# Patient Record
Sex: MALE | Race: WHITE | NOT HISPANIC OR LATINO | Employment: OTHER | URBAN - METROPOLITAN AREA
[De-identification: names, ages, dates, MRNs, and addresses within clinical notes are randomized per-mention and may not be internally consistent; named-entity substitution may affect disease eponyms.]

---

## 2017-01-05 ENCOUNTER — NON-PROVIDER VISIT (OUTPATIENT)
Dept: MEDICAL GROUP | Facility: MEDICAL CENTER | Age: 67
End: 2017-01-05
Payer: MEDICARE

## 2017-01-05 VITALS — HEIGHT: 74 IN | BODY MASS INDEX: 35.94 KG/M2 | WEIGHT: 280 LBS

## 2017-01-05 PROCEDURE — G0447 BEHAVIOR COUNSEL OBESITY 15M: HCPCS | Performed by: INTERNAL MEDICINE

## 2017-01-05 NOTE — MR AVS SNAPSHOT
"        Gaudencio Berry   2017 8:00 AM   Non-Provider Visit   MRN: [id number]    Department:  [facility]   Dept Phone:  [phone]    Description:  Male : 1950   Provider:  Carmen Merchant RD           Reason for Visit     Obesity           Allergies as of 2017     Allergen Noted Reactions    Codeine 10/22/2015   Rash, Itching    Per patient, itch all over body, rash    Shellfish Allergy 10/22/2015   Diarrhea, Vomiting    Per patient, GI issues, vomiting, diarrhea      Vital Signs     Height Weight Body Mass Index Smoking Status          1.88 m (6' 2\") 127.007 kg (280 lb) 35.93 kg/m2 Former Smoker        Basic Information     Date Of Birth Sex Race Ethnicity Preferred Language    1950 Male White Unknown English      Your appointments     2017  8:00 AM   Intensive Behavioral Therapy with Carmen Merchant RD   [facility])    [address]  [facility] [id number] [phone]              Problem List              ICD-10-CM Priority Class Noted - Resolved    Essential hypertension I10   10/22/2015 - Present    Arthritis of neck (HCC) M46.92   10/22/2015 - Present    Allergic rhinitis due to pollen J30.1   10/22/2015 - Present    Preventative health care Z00.00   2016 - Present    Bilateral hip pain M25.551, M25.552   2016 - Present    Dyslipidemia, goal LDL below 100 E78.5   2016 - Present    Obesity (BMI 30-39.9) E66.9   2016 - Present    Acute maxillary sinusitis J01.00   8/3/2016 - Present      Health Maintenance        Date Due Completion Dates    COLONOSCOPY 1999 ---    IMM INFLUENZA (1) 2016 10/22/2015    IMM DTaP/Tdap/Td Vaccine (2 - Td) 2020            Current Immunizations     13-VALENT PCV PREVNAR 2015    Influenza Vaccine Adult HD 10/22/2015    Pneumococcal polysaccharide vaccine (PPSV-23) 10/22/2015    SHINGLES VACCINE 2015    Tdap Vaccine 2010      Below and/or " attached are the medications your provider expects you to take. Review all of your home medications and newly ordered medications with your provider and/or pharmacist. Follow medication instructions as directed by your provider and/or pharmacist. Please keep your medication list with you and share with your provider. Update the information when medications are discontinued, doses are changed, or new medications (including over-the-counter products) are added; and carry medication information at all times in the event of emergency situations     Allergies:  CODEINE - Rash,Itching     SHELLFISH ALLERGY - Diarrhea,Vomiting               Medications  Valid as of: January 05, 2017 -  8:21 AM    Generic Name Brand Name Tablet Size Instructions for use    Amoxicillin-Pot Clavulanate (Tab) AUGMENTIN 875-125 MG Take 1 Tab by mouth 2 times a day.        Cholecalciferol (Cap) Vitamin D 2000 UNITS Take  by mouth every day.        Ibuprofen (Tab) MOTRIN 800 MG Take 1 Tab by mouth every 8 hours as needed.        Lisinopril-Hydrochlorothiazide (Tab) PRINZIDE, ZESTORETIC 10-12.5 MG Take 1 Tab by mouth every day.        Phenylephrine-APAP-Guaifenesin   Take  by mouth.        .                 Medicines prescribed today were sent to:     [facility] [address] [place] [address] (S), [place] [address]    [facility] ([place] [place] [address]    Phone: [phone] Fax: [phone]    Open 24 Hours?: No      Medication refill instructions:       If your prescription bottle indicates you have medication refills left, it is not necessary to call your provider’s office. Please contact your pharmacy and they will refill your medication.    If your prescription bottle indicates you do not have any refills left, you may request refills at any time through one of the following ways: The online Applango system (except Urgent Care), by calling your provider’s office, or by asking your pharmacy to contact your provider’s office with a refill request.  Medication refills are processed only during regular business hours and may not be available until the next business day. Your provider may request additional information or to have a follow-up visit with you prior to refilling your medication.   *Please Note: Medication refills are assigned a new Rx number when refilled electronically. Your pharmacy may indicate that no refills were authorized even though a new prescription for the same medication is available at the pharmacy. Please request the medicine by name with the pharmacy before contacting your provider for a refill.           [employer] Access Code: Activation code not generated  Current [employer] Status: Active

## 2017-01-05 NOTE — PROGRESS NOTES
"1/5/2017     Visit # 13     Referring Provider: Cony Judge A.P.R.N. Paul Vieira 66 y.o.           Time in/Out:   08:03-08:17am     ASSESS:    Filed Vitals:    01/05/17 0818   Height: 1.88 m (6' 2\")   Weight: 127.007 kg (280 lb)            Wt Readings from Last 2 Encounters:   01/05/17 127.007 kg (280 lb)   12/15/16 125.102 kg (275 lb 12.8 oz)            Body mass index is 35.93 kg/(m^2).       Weight change since last visit:  +4.2lb      Starting weight:  274.7lb      Total weight change:  +5.3lb      Current Dietary/Exercise Habits:  Pt states he has been eating out more, and eating larger, richer meals during the Holidays for the past 2 weeks. Also drinking more alcohol over the two 3 days weekends for the Holidays. Feels he has been consistent with his meal otherwise. Continues to be active at work.     Estimated Stage of Change:  Contemplation as evidenced by pt feeling that he cannot lose weight no matter what he does.      ADVISE:    Physical Activity:   Pt is active at work and gets about 10,000 steps per day. Also walks his dogs some days.      Dietary Guidelines:   Encouraged pt to keep a food journal this next week and keep better track of the unusual days of eating, rather than just the typical day. He may be consuming additional calories outside of his typical meal pattern that he is not mindful of. He seems to eat out a lot and consumes many high sodium precooked meals as well. Recommended that he bring in the food labels of some things he eats this week for us to review.     The patient has been advised of how weight management and physical activity impacts their health and will help to reduce complications and health risk factors.        AGREE:  Previous Goals:   1. Aim for >10,000 steps per day  2. Do not skip meals. Eat 3 meals a day    3. Eat leaner proteins; include a all meals     New Goals:   1. Aim for 10,000 steps or more per day.   2. Keep a food journal   3. Bring in food labels " of things you have eaten this past week or 2       ASSIST:  Pt has gained weight over the Holidays. Some may be from fluid retention as he has been eating more high sodium foods -- pastas at restaurants, soups, breads etc. He was not cautious of his food choices over the Holidays and that seems to be also why he has gained weight. Pt seems to think that he will not lose any weight despite eating what he feels is small balanced meals. Although his day to day meals are not likely exsessive in calories, he may be getting excess calories from eating out too often or other sources to be determined. We will review his food journal next visit.   Pt may need to start additional exercise in order to lose weight.     ARRANGE:     Return for follow-up in  2 weeks      The patient was assisted in making follow-up appointment per orders.

## 2017-01-19 ENCOUNTER — NON-PROVIDER VISIT (OUTPATIENT)
Dept: MEDICAL GROUP | Facility: MEDICAL CENTER | Age: 67
End: 2017-01-19
Payer: MEDICARE

## 2017-01-19 VITALS — WEIGHT: 280 LBS | HEIGHT: 74 IN | BODY MASS INDEX: 35.94 KG/M2

## 2017-01-19 PROCEDURE — G0447 BEHAVIOR COUNSEL OBESITY 15M: HCPCS | Performed by: FAMILY MEDICINE

## 2017-01-19 NOTE — MR AVS SNAPSHOT
Gaudencio Berry   2017 8:00 AM   Appointment   MRN: [id number]    Department:  [facility]   Dept Phone:  [phone]    Description:  Male : 1950   Provider:  Carmen Merchant RD           Allergies as of 2017     Allergen Noted Reactions    Codeine 10/22/2015   Rash, Itching    Per patient, itch all over body, rash    Shellfish Allergy 10/22/2015   Diarrhea, Vomiting    Per patient, GI issues, vomiting, diarrhea      Vital Signs     Smoking Status                   Former Smoker           Basic Information     Date Of Birth Sex Race Ethnicity Preferred Language    1950 Male White Unknown English      Your appointments     2017  8:00 AM   Intensive Behavioral Therapy with Carmen Merchant RD   [facility])    [address]  [facility] [address] [phone]              Problem List              ICD-10-CM Priority Class Noted - Resolved    Essential hypertension I10   10/22/2015 - Present    Arthritis of neck (CMS-HCC) M46.92   10/22/2015 - Present    Allergic rhinitis due to pollen J30.1   10/22/2015 - Present    Preventative health care Z00.00   2016 - Present    Bilateral hip pain M25.551, M25.552   2016 - Present    Dyslipidemia, goal LDL below 100 E78.5   2016 - Present    Obesity (BMI 30-39.9) E66.9   2016 - Present    Acute maxillary sinusitis J01.00   8/3/2016 - Present      Health Maintenance        Date Due Completion Dates    COLONOSCOPY 1999 ---    IMM INFLUENZA (1) 2016 10/22/2015    IMM DTaP/Tdap/Td Vaccine (2 - Td) 2020            Current Immunizations     13-VALENT PCV PREVNAR 2015    Influenza Vaccine Adult HD 10/22/2015    Pneumococcal polysaccharide vaccine (PPSV-23) 10/22/2015    SHINGLES VACCINE 2015    Tdap Vaccine 2010      Below and/or attached are the medications your provider expects you to take. Review all of your home medications and newly  ordered medications with your provider and/or pharmacist. Follow medication instructions as directed by your provider and/or pharmacist. Please keep your medication list with you and share with your provider. Update the information when medications are discontinued, doses are changed, or new medications (including over-the-counter products) are added; and carry medication information at all times in the event of emergency situations     Allergies:  CODEINE - Rash,Itching     SHELLFISH ALLERGY - Diarrhea,Vomiting               Medications  Valid as of: January 19, 2017 -  8:17 AM    Generic Name Brand Name Tablet Size Instructions for use    Amoxicillin-Pot Clavulanate (Tab) AUGMENTIN 875-125 MG Take 1 Tab by mouth 2 times a day.        Cholecalciferol (Cap) Vitamin D 2000 UNITS Take  by mouth every day.        Ibuprofen (Tab) MOTRIN 800 MG Take 1 Tab by mouth every 8 hours as needed.        Lisinopril-Hydrochlorothiazide (Tab) PRINZIDE, ZESTORETIC 10-12.5 MG Take 1 Tab by mouth every day.        Phenylephrine-APAP-Guaifenesin   Take  by mouth.        .                 Medicines prescribed today were sent to:     [address] ([place]), [place] - [address] [employer] [address] ([place]) [place] [address]    Phone: [phone] Fax: [phone]    Open 24 Hours?: No      Medication refill instructions:       If your prescription bottle indicates you have medication refills left, it is not necessary to call your provider’s office. Please contact your pharmacy and they will refill your medication.    If your prescription bottle indicates you do not have any refills left, you may request refills at any time through one of the following ways: The online HaveMyShift system (except Urgent Care), by calling your provider’s office, or by asking your pharmacy to contact your provider’s office with a refill request. Medication refills are processed only during regular business hours and may not be available until the next  business day. Your provider may request additional information or to have a follow-up visit with you prior to refilling your medication.   *Please Note: Medication refills are assigned a new Rx number when refilled electronically. Your pharmacy may indicate that no refills were authorized even though a new prescription for the same medication is available at the pharmacy. Please request the medicine by name with the pharmacy before contacting your provider for a refill.           [employer] Access Code: Activation code not generated  Current [employer] Status: Active

## 2017-01-19 NOTE — PROGRESS NOTES
"1/19/2017     Visit # 13     Referring Provider: Cony Judge A.P.R.N. Paul Vieira 66 y.o.           Time in/Out:   0800-0820am    ASSESS:    Filed Vitals:    01/19/17 1031   Height: 1.88 m (6' 2.02\")   Weight: 127.007 kg (280 lb)            Wt Readings from Last 2 Encounters:   01/19/17 127.007 kg (280 lb)   01/05/17 127.007 kg (280 lb)            Body mass index is 35.93 kg/(m^2).       Weight change since last visit:   +4.2lb      Starting weight:  274.7lb      Total weight change:  +5.3lb      Current Dietary/Exercise Habits:  Pt kept a food journal this week. He is eating 3 meals a day and 0-1 snacks. Diet hx: B- oatmeal inst packet. Coffee with half and half. S- orange. L- reduced sodium salami or turkey sandwich with 15 chips, and banana or fruit. Water. D- rice, chicken sausage, water. Or another sandwich with chips. Pt is walking 3-4 miles a day (based on fit bit)     Estimated Stage of Change:  Preparation as evidenced by planning to eat more vegetables .      ADVISE:    Physical Activity:  Explained that in order to lose weight, pt will need to either increase calorie expenditure or decrease calorie consumption. He feels he is an active as he can be.      Dietary Guidelines:   Reviewed diet hx, and label reading for the salami. Although it is reduced sodium explained that it is still high in sodium (1oz = 14% DV) and high in saturated fat. He agrees to doing more turkey roasted pieces, cooked at home. Also explained that the chips he consume also provides high amounts of sodium and fat and suggested baked chips.     The patient has been advised of how weight management and physical activity impacts their health and will help to reduce complications and health risk factors.        AGREE:  Previous Goals:   1. Aim for 10,000 steps or more per day.    2. Keep a food journal    3. Bring in food labels of things you have eaten this past week or 2     New Goals:   1. Eat more vegetables + at lunch and " dinner   2. Choose salami 1-2 times per week , otherwise would do turkey for sandwiches   3. Consider switching to baked chips.       ASSIST:  Based on diet hx/ food journal patient is not likely exceeding his calorie goal. Estimated calorie intake is ~1700-1800kcal per day. Maybe more, based on estimated portions. If pt is eating ~1800kcal, I would anticipate he should losing weight. He is either eating large portions than indicated or may have lower calorie needs than expected. Either way, pt has not been able to lose weight at current calorie consumption. He would either need to decrease kcal by 100-200kcal and/or increase energy expenditure. He also has not been eating many vegetables and would benefit from that. Additionally, he food journal indicates he is consuming >3000mg sodium per day. Goal should be 2000mg/day.   Pt has 1 more visit and unfortunately has not been able to lose any weight.       ARRANGE:     Return for follow-up in 2 weeks     The patient was assisted in making follow-up appointment per orders.

## 2017-02-02 ENCOUNTER — NON-PROVIDER VISIT (OUTPATIENT)
Dept: MEDICAL GROUP | Facility: MEDICAL CENTER | Age: 67
End: 2017-02-02
Payer: MEDICARE

## 2017-02-02 VITALS — HEIGHT: 74 IN | WEIGHT: 280 LBS | BODY MASS INDEX: 35.94 KG/M2

## 2017-02-02 PROCEDURE — G0447 BEHAVIOR COUNSEL OBESITY 15M: HCPCS | Performed by: FAMILY MEDICINE

## 2017-02-02 NOTE — MR AVS SNAPSHOT
"        Gaudencio Berry   2017 8:00 AM   Non-Provider Visit   MRN: [id number]    Department:  [facility]   Dept Phone:  [phone]    Description:  Male : 1950   Provider:  Carmen Cho RD           Reason for Visit     Obesity           Allergies as of 2017     Allergen Noted Reactions    Codeine 10/22/2015   Rash, Itching    Per patient, itch all over body, rash    Shellfish Allergy 10/22/2015   Diarrhea, Vomiting    Per patient, GI issues, vomiting, diarrhea      Vital Signs     Height Weight Body Mass Index Smoking Status          1.88 m (6' 2\") 127.007 kg (280 lb) 35.93 kg/m2 Former Smoker        Basic Information     Date Of Birth Sex Race Ethnicity Preferred Language    1950 Male White Unknown English      Your appointments     2017  7:40 AM   Access New To You with Hannah Garcia M.D.   [facility]    [address]  [facility] [address] [phone]              Problem List              ICD-10-CM Priority Class Noted - Resolved    Essential hypertension I10   10/22/2015 - Present    Arthritis of neck (CMS-HCC) M46.92   10/22/2015 - Present    Allergic rhinitis due to pollen J30.1   10/22/2015 - Present    Preventative health care Z00.00   2016 - Present    Bilateral hip pain M25.551, M25.552   2016 - Present    Dyslipidemia, goal LDL below 100 E78.5   2016 - Present    Obesity (BMI 30-39.9) E66.9   2016 - Present    Acute maxillary sinusitis J01.00   8/3/2016 - Present      Health Maintenance        Date Due Completion Dates    COLONOSCOPY 1999 ---    IMM INFLUENZA (1) 2016 10/22/2015    IMM DTaP/Tdap/Td Vaccine (2 - Td) 2020            Current Immunizations     13-VALENT PCV PREVNAR 2015    Influenza Vaccine Adult HD 10/22/2015    Pneumococcal polysaccharide vaccine (PPSV-23) 10/22/2015    SHINGLES VACCINE 2015    Tdap Vaccine 2010      Below and/or attached are " the medications your provider expects you to take. Review all of your home medications and newly ordered medications with your provider and/or pharmacist. Follow medication instructions as directed by your provider and/or pharmacist. Please keep your medication list with you and share with your provider. Update the information when medications are discontinued, doses are changed, or new medications (including over-the-counter products) are added; and carry medication information at all times in the event of emergency situations     Allergies:  CODEINE - Rash,Itching     SHELLFISH ALLERGY - Diarrhea,Vomiting               Medications  Valid as of: February 02, 2017 -  8:19 AM    Generic Name Brand Name Tablet Size Instructions for use    Amoxicillin-Pot Clavulanate (Tab) AUGMENTIN 875-125 MG Take 1 Tab by mouth 2 times a day.        Cholecalciferol (Cap) Vitamin D 2000 UNITS Take  by mouth every day.        Ibuprofen (Tab) MOTRIN 800 MG Take 1 Tab by mouth every 8 hours as needed.        Lisinopril-Hydrochlorothiazide (Tab) PRINZIDE, ZESTORETIC 10-12.5 MG Take 1 Tab by mouth every day.        Phenylephrine-APAP-Guaifenesin   Take  by mouth.        .                 Medicines prescribed today were sent to:     [facility] [address] [place] [address] ([place]), [place] - [address]    [facility] ([place]) [place] [address]    Phone: [phone] Fax: [phone]    Open 24 Hours?: No      Medication refill instructions:       If your prescription bottle indicates you have medication refills left, it is not necessary to call your provider’s office. Please contact your pharmacy and they will refill your medication.    If your prescription bottle indicates you do not have any refills left, you may request refills at any time through one of the following ways: The online [employer] system (except Urgent Care), by calling your provider’s office, or by asking your pharmacy to contact your provider’s office with a refill request. Medication  refills are processed only during regular business hours and may not be available until the next business day. Your provider may request additional information or to have a follow-up visit with you prior to refilling your medication.   *Please Note: Medication refills are assigned a new Rx number when refilled electronically. Your pharmacy may indicate that no refills were authorized even though a new prescription for the same medication is available at the pharmacy. Please request the medicine by name with the pharmacy before contacting your provider for a refill.           Secure Software Access Code: Activation code not generated  Current Secure Software Status: Active

## 2017-02-02 NOTE — PROGRESS NOTES
"2/2/2017     Visit #14      Referring Provider: Cony Judge A.P.R.N. Paul Vieira 66 y.o.           Time in/Out:   0800-0820am     ASSESS:    Filed Vitals:    02/02/17 0758   Height: 1.88 m (6' 2\")   Weight: 127.007 kg (280 lb)            Wt Readings from Last 2 Encounters:   02/02/17 127.007 kg (280 lb)   01/19/17 127.007 kg (280 lb)            Body mass index is 35.93 kg/(m^2).       Weight change since last visit:   -0lb      Starting weight:  274.7lb       Total weight change:  +5.3lb      Current Dietary/Exercise Habits:  Pt states he has been eating out more this past few weeks. He has been busy and on the go so is not eating at home as much. Pt has been working more hours at his job than he would like. His wife also works full time so they are not cooking much for meals at home. Does bring his lunch to work. Has decided to reduce his sandwich to half and cut back on the chips. Also plans to reduce his bread intake overall. He feels he can be more active once he is not working so much and when the weather is nicer    Estimated Stage of Change:  Preparation as evidenced by starting to understand that he need to try changes to his diet in order to see results.      ADVISE:    Physical Activity:   Pt is active at his job and tries to walk in the mornings before work. Feels this will get better when the weather is nicer.      Dietary Guidelines:  Reviewed my fitness pal entry of a \"typical\" day of eating that he previously provided me. Based on that day, pt consumes ~1700kcal between 3 meals. That of course is not when he eats out which I feel he is likely exceeding his kcal goal those days. His total carb intake does seem high, especially at lunch and he is not eating enough veggies. He agrees to bring salad to work with 1/2 sandwich (ideally low sodium turkey instead of salami), with fruit instead of chips.     The patient has been advised of how weight management and physical activity impacts their " health and will help to reduce complications and health risk factors.        AGREE:  Previous Goals:   1. Eat more vegetables + at lunch and dinner    2. Choose salami 1-2 times per week , otherwise would do turkey for sandwiches    3. Consider switching to baked chips.       ASSIST:  Pt has started to think about and verbalize when he is willing to change with his diet. He reports that he eats very similar meals each day . I have made suggestions multiple times to cut back on the chips and salami. It was not until today he brought it up himself that he is willing to change that. Unfortunately pt did not lose >3kg in the past 6  Months and has in fact gained weight. Therefore today is our final meeting. He can restart the program in 6 months if he feels it would benefit him and if he is more willing to try the dietary changes suggested.       ARRANGE:     Return for follow-up in 6th months as desired. Would need a new Referral      The patient was assisted in making follow-up appointment per orders.

## 2017-02-24 ENCOUNTER — OFFICE VISIT (OUTPATIENT)
Dept: MEDICAL GROUP | Facility: MEDICAL CENTER | Age: 67
End: 2017-02-24
Payer: MEDICARE

## 2017-02-24 VITALS
WEIGHT: 285 LBS | HEART RATE: 88 BPM | BODY MASS INDEX: 36.57 KG/M2 | HEIGHT: 74 IN | SYSTOLIC BLOOD PRESSURE: 128 MMHG | DIASTOLIC BLOOD PRESSURE: 88 MMHG | TEMPERATURE: 97.4 F | OXYGEN SATURATION: 95 %

## 2017-02-24 DIAGNOSIS — L81.9 CHANGING PIGMENTED SKIN LESION: ICD-10-CM

## 2017-02-24 DIAGNOSIS — M16.0 BILATERAL HIP JOINT ARTHRITIS: ICD-10-CM

## 2017-02-24 DIAGNOSIS — E66.9 OBESITY (BMI 30-39.9): ICD-10-CM

## 2017-02-24 DIAGNOSIS — M47.812 ARTHRITIS OF NECK: ICD-10-CM

## 2017-02-24 DIAGNOSIS — E78.5 DYSLIPIDEMIA, GOAL LDL BELOW 100: ICD-10-CM

## 2017-02-24 DIAGNOSIS — I10 ESSENTIAL HYPERTENSION: ICD-10-CM

## 2017-02-24 PROCEDURE — G8432 DEP SCR NOT DOC, RNG: HCPCS | Performed by: FAMILY MEDICINE

## 2017-02-24 PROCEDURE — 1036F TOBACCO NON-USER: CPT | Performed by: FAMILY MEDICINE

## 2017-02-24 PROCEDURE — 4040F PNEUMOC VAC/ADMIN/RCVD: CPT | Performed by: FAMILY MEDICINE

## 2017-02-24 PROCEDURE — G8419 CALC BMI OUT NRM PARAM NOF/U: HCPCS | Performed by: FAMILY MEDICINE

## 2017-02-24 PROCEDURE — 1101F PT FALLS ASSESS-DOCD LE1/YR: CPT | Performed by: FAMILY MEDICINE

## 2017-02-24 PROCEDURE — 3017F COLORECTAL CA SCREEN DOC REV: CPT | Mod: 1P | Performed by: FAMILY MEDICINE

## 2017-02-24 PROCEDURE — 99214 OFFICE O/P EST MOD 30 MIN: CPT | Performed by: FAMILY MEDICINE

## 2017-02-24 PROCEDURE — G8484 FLU IMMUNIZE NO ADMIN: HCPCS | Performed by: FAMILY MEDICINE

## 2017-02-24 RX ORDER — IBUPROFEN 800 MG/1
800 TABLET ORAL 2 TIMES DAILY
Qty: 180 TAB | Refills: 3 | Status: SHIPPED | OUTPATIENT
Start: 2017-02-24 | End: 2018-05-07 | Stop reason: SDUPTHER

## 2017-02-24 ASSESSMENT — PATIENT HEALTH QUESTIONNAIRE - PHQ9: CLINICAL INTERPRETATION OF PHQ2 SCORE: 0

## 2017-02-24 NOTE — MR AVS SNAPSHOT
"        Gaudencio Veraeira   2017 7:40 AM   Office Visit   MRN: [id number]    Department:  [facility]   Dept Phone:  [phone]    Description:  Male : 1950   Provider:  Hannah Garcia M.D.           Reason for Visit     Establish Care           Allergies as of 2017     Allergen Noted Reactions    Codeine 10/22/2015   Rash, Itching    Per patient, itch all over body, rash    Shellfish Allergy 10/22/2015   Diarrhea, Vomiting    Per patient, GI issues, vomiting, diarrhea      You were diagnosed with     Essential hypertension   [3448169]       Arthritis of neck (CMS-[facility])   [738082]       Bilateral hip joint arthritis   [1634461]       Dyslipidemia, goal LDL below 100   [264675]       Obesity (BMI 30-39.9)   [281250]       Changing pigmented skin lesion   [5202186]         Vital Signs     Blood Pressure Pulse Temperature Height Weight Body Mass Index    128/88 mmHg 88 36.3 °C (97.4 °F) 1.88 m (6' 2\") 129.275 kg (285 lb) 36.58 kg/m2    Oxygen Saturation Smoking Status                95% Former Smoker          Basic Information     Date Of Birth Sex Race Ethnicity Preferred Language    1950 Male White Non- English      Your appointments     Mar 02, 2017  8:40 AM   Established Patient with Hannah Garcia M.D.   [facility])    [address]  [facility] [id number] [phone]           You will be receiving a confirmation call a few days before your appointment from our automated call confirmation system.              Problem List              ICD-10-CM Priority Class Noted - Resolved    Essential hypertension I10   10/22/2015 - Present    Arthritis of neck (CMS-HCC) M46.92   10/22/2015 - Present    Allergic rhinitis due to pollen J30.1   10/22/2015 - Present    Preventative health care Z00.00   2016 - Present    Bilateral hip joint arthritis M19.90   2016 - Present    Dyslipidemia, goal LDL below 100 E78.5   2016 - Present  "    Obesity (BMI 30-39.9) E66.9   7/20/2016 - Present    Acute maxillary sinusitis J01.00   8/3/2016 - Present    Changing pigmented skin lesion D22.9   2/24/2017 - Present      Health Maintenance        Date Due Completion Dates    COLONOSCOPY 6/26/1999 ---    IMM INFLUENZA (1) 9/1/2016 10/22/2015    IMM DTaP/Tdap/Td Vaccine (2 - Td) 1/21/2020 1/21/2010            Current Immunizations     13-VALENT PCV PREVNAR 5/5/2015    Influenza Vaccine Adult HD 10/22/2015    Pneumococcal polysaccharide vaccine (PPSV-23) 10/22/2015    SHINGLES VACCINE 5/5/2015    Tdap Vaccine 1/21/2010      Below and/or attached are the medications your provider expects you to take. Review all of your home medications and newly ordered medications with your provider and/or pharmacist. Follow medication instructions as directed by your provider and/or pharmacist. Please keep your medication list with you and share with your provider. Update the information when medications are discontinued, doses are changed, or new medications (including over-the-counter products) are added; and carry medication information at all times in the event of emergency situations     Allergies:  CODEINE - Rash,Itching     SHELLFISH ALLERGY - Diarrhea,Vomiting               Medications  Valid as of: February 24, 2017 -  8:17 AM    Generic Name Brand Name Tablet Size Instructions for use    Cetirizine HCl   Take  by mouth.        Cholecalciferol (Cap) Vitamin D 2000 UNITS Take  by mouth every day.        Ibuprofen (Tab) MOTRIN 800 MG Take 1 Tab by mouth 2 Times a Day.        Lisinopril-Hydrochlorothiazide (Tab) PRINZIDE, ZESTORETIC 10-12.5 MG Take 1 Tab by mouth every day.        .                 Medicines prescribed today were sent to:     [facility]  [place] [address] [address] [facility] [place] [address]    Phone: [phone] Fax: [phone]    Open 24 Hours?: No      Medication refill instructions:       If your prescription bottle  indicates you have medication refills left, it is not necessary to call your provider’s office. Please contact your pharmacy and they will refill your medication.    If your prescription bottle indicates you do not have any refills left, you may request refills at any time through one of the following ways: The online [employer] / [employer]LL system (except Urgent Care), by calling your provider’s office, or by asking your pharmacy to contact your provider’s office with a refill request. Medication refills are processed only during regular business hours and may not be available until the next business day. Your provider may request additional information or to have a follow-up visit with you prior to refilling your medication.   *Please Note: Medication refills are assigned a new Rx number when refilled electronically. Your pharmacy may indicate that no refills were authorized even though a new prescription for the same medication is available at the pharmacy. Please request the medicine by name with the pharmacy before contacting your provider for a refill.           [employer] / TLL Access Code: Activation code not generated  Current [employer] / [employer]LL Status: Active

## 2017-03-01 NOTE — ASSESSMENT & PLAN NOTE
Patient has a history of hypertension that has been well controlled with lisinopril-HCTZ.  He denies any unusual headaches, syncope or cough.

## 2017-03-01 NOTE — ASSESSMENT & PLAN NOTE
Patient has an enlarging skin lesion that is also getting darker.  He does not have a dermatologist.

## 2017-03-01 NOTE — ASSESSMENT & PLAN NOTE
Patient has DDD of the cervical spine that he manages with ibuprofen 800 mg as needed.  He denies any numbness or tingling.  No loss of bowel or bladder function.  He also uses the ibuprofen for his bilateral hip osteoarthritis.

## 2017-03-01 NOTE — PROGRESS NOTES
Chief Complaint   Patient presents with   • Establish Care         Gaudencio Berry is a 66 y.o. male here to establish care and for evaluation and management of:        HPI:    Essential hypertension  Patient has a history of hypertension that has been well controlled with lisinopril-HCTZ.  He denies any unusual headaches, syncope or cough.    Arthritis of neck  Patient has DDD of the cervical spine that he manages with ibuprofen 800 mg as needed.  He denies any numbness or tingling.  No loss of bowel or bladder function.  He also uses the ibuprofen for his bilateral hip osteoarthritis.    Dyslipidemia, goal LDL below 100  Patient has been managing his hyperlipidemia with diet.  He is resistant to medication.    Changing pigmented skin lesion  Patient has an enlarging skin lesion that is also getting darker.  He does not have a dermatologist.        Allergies   Allergen Reactions   • Codeine Rash and Itching     Per patient, itch all over body, rash   • Shellfish Allergy Diarrhea and Vomiting     Per patient, GI issues, vomiting, diarrhea       Current medicines (including changes today)  Current Outpatient Prescriptions   Medication Sig Dispense Refill   • Cetirizine HCl (KLS ALLER-ALIA PO) Take  by mouth.     • ibuprofen (MOTRIN) 800 MG Tab Take 1 Tab by mouth 2 Times a Day. 180 Tab 3   • lisinopril-hydrochlorothiazide (PRINZIDE, ZESTORETIC) 10-12.5 MG per tablet Take 1 Tab by mouth every day. 90 Tab 3   • Cholecalciferol (VITAMIN D) 2000 UNITS Cap Take  by mouth every day.       No current facility-administered medications for this visit.     He  has a past medical history of Hypertension; Cataract; Allergy; Arthritis; and Hyperlipidemia.  He  has past surgical history that includes cataract phaco with iol; eye surgery; and inguinal hernia repair (Right).  Social History   Substance Use Topics   • Smoking status: Former Smoker -- 0.50 packs/day for 15 years     Types: Cigarettes     Quit date: 01/01/1982   • Smokeless  "tobacco: Never Used   • Alcohol Use: 1.2 oz/week     0 Standard drinks or equivalent, 2 Shots of liquor per week      Comment: occasionally     Social History     Social History Narrative     Family History   Problem Relation Age of Onset   • Heart Disease Mother       at 64     Family Status   Relation Status Death Age   • Mother     • Father     • Maternal Grandmother     • Maternal Grandfather     • Paternal Grandmother     • Paternal Grandfather           ROS  No fever or chills.  No nausea or vomiting.  No chest pain or palpitations.  No cough or SOB.  No pain with urination or hematuria.  No black or bloody stools.  All other systems reviewed and are negative     Objective:     Blood pressure 128/88, pulse 88, temperature 36.3 °C (97.4 °F), height 1.88 m (6' 2\"), weight 129.275 kg (285 lb), SpO2 95 %. Body mass index is 36.58 kg/(m^2).  Physical Exam:      Well developed, well nourished.  Alert, oriented in no acute distress.  Psych: Eye contact is good, speech goal directed, affect calm  Eyes: conjunctiva non-injected, sclera non-icteric.  Neck Supple.  Full ROM. No adenopathy or masses in the neck or supraclavicular regions. No thyromegaly  Lungs: clear to auscultation bilaterally with good excursion. No wheezes or rhonchi  CV: regular rate and rhythm. No murmur  Abdomen: soft, nontender, no masses or organomegaly.  No rebound or guarding  Ext: no edema, color normal, vascularity normal, temperature normal  Skin - raised irregular lesion with darkened areas    Assessment and Plan:   The following treatment plan was discussed    1. Essential hypertension  Good control.  Continue current medications    2. Arthritis of neck (CMS-[facility])  Refill ibuprofen for prn use.  - ibuprofen (MOTRIN) 800 MG Tab; Take 1 Tab by mouth 2 Times a Day.  Dispense: 180 Tab; Refill: 3    3. Bilateral hip joint arthritis  See #3  - ibuprofen (MOTRIN) 800 MG Tab; Take 1 Tab by mouth 2 " Times a Day.  Dispense: 180 Tab; Refill: 3    4. Dyslipidemia, goal LDL below 100  Dietary counseling done.  Check labs    5. Obesity (BMI 30-39.9)  Encourage weight loss for overall health    6. Changing pigmented skin lesion  Schedule excisional biopsy      Records requested.    Any change or worsening of signs or symptoms, patient encouraged to follow-up or report to the emergency room for further evaluation. Patient understands and agrees.    Followup: Return schedule biopsy.

## 2017-03-02 ENCOUNTER — HOSPITAL ENCOUNTER (OUTPATIENT)
Facility: MEDICAL CENTER | Age: 67
End: 2017-03-02
Attending: FAMILY MEDICINE
Payer: MEDICARE

## 2017-03-02 ENCOUNTER — OFFICE VISIT (OUTPATIENT)
Dept: MEDICAL GROUP | Facility: MEDICAL CENTER | Age: 67
End: 2017-03-02
Payer: MEDICARE

## 2017-03-02 VITALS
OXYGEN SATURATION: 96 % | TEMPERATURE: 98.4 F | DIASTOLIC BLOOD PRESSURE: 88 MMHG | BODY MASS INDEX: 36.06 KG/M2 | HEART RATE: 94 BPM | HEIGHT: 74 IN | WEIGHT: 281 LBS | SYSTOLIC BLOOD PRESSURE: 132 MMHG

## 2017-03-02 DIAGNOSIS — L81.9 CHANGING PIGMENTED SKIN LESION: ICD-10-CM

## 2017-03-02 PROCEDURE — G8419 CALC BMI OUT NRM PARAM NOF/U: HCPCS | Performed by: FAMILY MEDICINE

## 2017-03-02 PROCEDURE — 11402 EXC TR-EXT B9+MARG 1.1-2 CM: CPT | Performed by: FAMILY MEDICINE

## 2017-03-02 PROCEDURE — 99999 PR NO CHARGE: CPT | Performed by: FAMILY MEDICINE

## 2017-03-02 PROCEDURE — 4040F PNEUMOC VAC/ADMIN/RCVD: CPT | Performed by: FAMILY MEDICINE

## 2017-03-02 PROCEDURE — 88305 TISSUE EXAM BY PATHOLOGIST: CPT

## 2017-03-02 NOTE — ASSESSMENT & PLAN NOTE
Patient reports that the lesion on his right upper arm has been enlarging and changing color. He has had significant sun exposure over the years. He denies any bleeding or signs of infection.

## 2017-03-02 NOTE — MR AVS SNAPSHOT
"        Gaudencio Berry   3/2/2017 8:40 AM   Office Visit   MRN: [id number]    Department:  [facility]   Dept Phone:  [phone]    Description:  Male : 1950   Provider:  Hannah Garcia M.D.           Reason for Visit     Biopsy excisional from R upper arm      Allergies as of 3/2/2017     Allergen Noted Reactions    Codeine 10/22/2015   Rash, Itching    Per patient, itch all over body, rash    Shellfish Allergy 10/22/2015   Diarrhea, Vomiting    Per patient, GI issues, vomiting, diarrhea      You were diagnosed with     Changing pigmented skin lesion   [3181458]         Vital Signs     Blood Pressure Pulse Temperature Height Weight Body Mass Index    132/88 mmHg 94 36.9 °C (98.4 °F) 1.88 m (6' 2.02\") 127.461 kg (281 lb) 36.06 kg/m2    Oxygen Saturation Smoking Status                96% Former Smoker          Basic Information     Date Of Birth Sex Race Ethnicity Preferred Language    1950 Male White Non- English      Your appointments     Mar 16, 2017  3:40 PM   Established Patient with Hannah Garcia M.D.   [facility]    [address]  [facility] [id number] [phone]           You will be receiving a confirmation call a few days before your appointment from our automated call confirmation system.              Problem List              ICD-10-CM Priority Class Noted - Resolved    Essential hypertension I10   10/22/2015 - Present    Arthritis of neck (CMS-HCC) M46.92   10/22/2015 - Present    Allergic rhinitis due to pollen J30.1   10/22/2015 - Present    Preventative health care Z00.00   2016 - Present    Bilateral hip joint arthritis M19.90   2016 - Present    Dyslipidemia, goal LDL below 100 E78.5   2016 - Present    Obesity (BMI 30-39.9) E66.9   2016 - Present    Acute maxillary sinusitis J01.00   8/3/2016 - Present    Changing pigmented skin lesion D22.9   2017 - Present      Health Maintenance        Date " Due Completion Dates    COLONOSCOPY 6/26/1999 ---    IMM INFLUENZA (1) 9/1/2016 10/22/2015    IMM DTaP/Tdap/Td Vaccine (2 - Td) 1/21/2020 1/21/2010            Current Immunizations     13-VALENT PCV PREVNAR 5/5/2015    Influenza Vaccine Adult HD 10/22/2015    Pneumococcal polysaccharide vaccine (PPSV-23) 10/22/2015    SHINGLES VACCINE 5/5/2015    Tdap Vaccine 1/21/2010      Below and/or attached are the medications your provider expects you to take. Review all of your home medications and newly ordered medications with your provider and/or pharmacist. Follow medication instructions as directed by your provider and/or pharmacist. Please keep your medication list with you and share with your provider. Update the information when medications are discontinued, doses are changed, or new medications (including over-the-counter products) are added; and carry medication information at all times in the event of emergency situations     Allergies:  CODEINE - Rash,Itching     SHELLFISH ALLERGY - Diarrhea,Vomiting               Medications  Valid as of: March 02, 2017 - 10:27 AM    Generic Name Brand Name Tablet Size Instructions for use    Cetirizine HCl   Take  by mouth.        Cholecalciferol (Cap) Vitamin D 2000 UNITS Take  by mouth every day.        Ibuprofen (Tab) MOTRIN 800 MG Take 1 Tab by mouth 2 Times a Day.        Lisinopril-Hydrochlorothiazide (Tab) PRINZIDE, ZESTORETIC 10-12.5 MG Take 1 Tab by mouth every day.        .                 Medicines prescribed today were sent to:     [facility] [place] - [address] [address] [facility] [place] [address]    Phone: [phone] Fax: [phone]    Open 24 Hours?: No      Medication refill instructions:       If your prescription bottle indicates you have medication refills left, it is not necessary to call your provider’s office. Please contact your pharmacy and they will refill your medication.    If your prescription bottle indicates you do  not have any refills left, you may request refills at any time through one of the following ways: The online [employer] system (except Urgent Care), by calling your provider’s office, or by asking your pharmacy to contact your provider’s office with a refill request. Medication refills are processed only during regular business hours and may not be available until the next business day. Your provider may request additional information or to have a follow-up visit with you prior to refilling your medication.   *Please Note: Medication refills are assigned a new Rx number when refilled electronically. Your pharmacy may indicate that no refills were authorized even though a new prescription for the same medication is available at the pharmacy. Please request the medicine by name with the pharmacy before contacting your provider for a refill.        Your To Do List     Future Labs/Procedures Complete By Expires    PATHOLOGY SPECIMEN  As directed 3/2/2018    Comments:    Lesion from right upper arm         [employer] Access Code: Activation code not generated  Current [employer] Status: Active

## 2017-03-06 NOTE — PROGRESS NOTES
"Subjective:     Chief Complaint   Patient presents with   • Biopsy     excisional from R upper arm       Gaudencio Berry is a 66 y.o. male here today for evaluation and management of:    Changing pigmented skin lesion  Patient reports that the lesion on his right upper arm has been enlarging and changing color. He has had significant sun exposure over the years. He denies any bleeding or signs of infection.         Allergies   Allergen Reactions   • Codeine Rash and Itching     Per patient, itch all over body, rash   • Shellfish Allergy Diarrhea and Vomiting     Per patient, GI issues, vomiting, diarrhea       Current medicines (including changes today)  Current Outpatient Prescriptions   Medication Sig Dispense Refill   • Cetirizine HCl (KLS ALLER-ALIA PO) Take  by mouth.     • ibuprofen (MOTRIN) 800 MG Tab Take 1 Tab by mouth 2 Times a Day. 180 Tab 3   • lisinopril-hydrochlorothiazide (PRINZIDE, ZESTORETIC) 10-12.5 MG per tablet Take 1 Tab by mouth every day. 90 Tab 3   • Cholecalciferol (VITAMIN D) 2000 UNITS Cap Take  by mouth every day.       No current facility-administered medications for this visit.       He  has a past medical history of Hypertension; Cataract; Allergy; Arthritis; and Hyperlipidemia.    Patient Active Problem List    Diagnosis Date Noted   • Changing pigmented skin lesion 02/24/2017   • Acute maxillary sinusitis 08/03/2016   • Dyslipidemia, goal LDL below 100 07/20/2016   • Obesity (BMI 30-39.9) 07/20/2016   • Preventative health care 06/28/2016   • Bilateral hip joint arthritis 06/28/2016   • Essential hypertension 10/22/2015   • Arthritis of neck (CMS-HCC) 10/22/2015   • Allergic rhinitis due to pollen 10/22/2015       ROS   No fever or chills.         Objective:     Blood pressure 132/88, pulse 94, temperature 36.9 °C (98.4 °F), height 1.88 m (6' 2.02\"), weight 127.461 kg (281 lb), SpO2 96 %. Body mass index is 36.06 kg/(m^2).   Physical Exam:  Well developed, well nourished.  Alert, oriented " in no acute distress.  Eye contact is good, speech goal directed, affect calm  Eyes: conjunctiva non-injected, sclera non-icteric.  Skin 1.2 cm lesion of right upper arm with irregular borders and darkened areas and with depigmented halo.      Assessment and Plan:   The following treatment plan was discussed    1. Changing pigmented skin lesion  Paitent was counseled of risks and benefits of excisional biopsy including infection, bleeding and scaring.  Consent obtained. Good anesthesia obtained with 1% lidocaine with epi.  Eliptical excision made that totally encompasses the lesion with a 1-2 mm margin.  Lesion dissected out and specimen sent for pathology.  Incision closed with #5 5-0 ethilon interrupted sutures.  Good wound approximation and hemostasis obtained.  Patient tolerated the procedure well.  Blood loss 30 ml.  Wound care discussed.  Follow-up in 2 weeks for suture removal.  - PATHOLOGY SPECIMEN; Future    Any change or worsening of signs or symptoms, patient encouraged to follow-up or report to the emergency room for further evaluation. Patient understands and agrees.    Followup: Return in about 2 weeks (around 3/16/2017).

## 2017-03-10 ENCOUNTER — OFFICE VISIT (OUTPATIENT)
Dept: MEDICAL GROUP | Facility: MEDICAL CENTER | Age: 67
End: 2017-03-10
Payer: MEDICARE

## 2017-03-10 VITALS
OXYGEN SATURATION: 97 % | SYSTOLIC BLOOD PRESSURE: 130 MMHG | TEMPERATURE: 97.9 F | HEIGHT: 74 IN | WEIGHT: 282 LBS | HEART RATE: 80 BPM | BODY MASS INDEX: 36.19 KG/M2 | DIASTOLIC BLOOD PRESSURE: 72 MMHG

## 2017-03-10 DIAGNOSIS — R06.89 ABNORMAL BREATH SOUNDS: ICD-10-CM

## 2017-03-10 DIAGNOSIS — R05.9 COUGH: ICD-10-CM

## 2017-03-10 PROCEDURE — 4040F PNEUMOC VAC/ADMIN/RCVD: CPT | Performed by: NURSE PRACTITIONER

## 2017-03-10 PROCEDURE — 99214 OFFICE O/P EST MOD 30 MIN: CPT | Performed by: NURSE PRACTITIONER

## 2017-03-10 PROCEDURE — G8484 FLU IMMUNIZE NO ADMIN: HCPCS | Performed by: NURSE PRACTITIONER

## 2017-03-10 PROCEDURE — G8419 CALC BMI OUT NRM PARAM NOF/U: HCPCS | Performed by: NURSE PRACTITIONER

## 2017-03-10 PROCEDURE — G8432 DEP SCR NOT DOC, RNG: HCPCS | Performed by: NURSE PRACTITIONER

## 2017-03-10 PROCEDURE — 1101F PT FALLS ASSESS-DOCD LE1/YR: CPT | Performed by: NURSE PRACTITIONER

## 2017-03-10 PROCEDURE — 1036F TOBACCO NON-USER: CPT | Performed by: NURSE PRACTITIONER

## 2017-03-10 PROCEDURE — 3017F COLORECTAL CA SCREEN DOC REV: CPT | Mod: 1P | Performed by: NURSE PRACTITIONER

## 2017-03-10 RX ORDER — AZITHROMYCIN 250 MG/1
TABLET, FILM COATED ORAL
Qty: 6 TAB | Refills: 0 | Status: SHIPPED | OUTPATIENT
Start: 2017-03-10 | End: 2017-07-13

## 2017-03-10 NOTE — MR AVS SNAPSHOT
"        Gaudencio Berry   3/10/2017 11:40 AM   Office Visit   MRN: [id number]    Department:  [facility]   Dept Phone:  [phone]    Description:  Male : 1950   Provider:  VIVIAN Baltazar           Reason for Visit     Cough x1 week     Pharyngitis x1 week       Allergies as of 3/10/2017     Allergen Noted Reactions    Codeine 10/22/2015   Rash, Itching    Per patient, itch all over body, rash    Shellfish Allergy 10/22/2015   Diarrhea, Vomiting    Per patient, GI issues, vomiting, diarrhea      You were diagnosed with     Cough   [786.2.ICD-9-CM]       Abnormal breath sounds   [804997]         Vital Signs     Blood Pressure Pulse Temperature Height Weight Body Mass Index    130/72 mmHg 80 36.6 °C (97.9 °F) 1.88 m (6' 2.02\") 127.914 kg (282 lb) 36.19 kg/m2    Oxygen Saturation Smoking Status                97% Former Smoker          Basic Information     Date Of Birth Sex Race Ethnicity Preferred Language    1950 Male White Non- English      Your appointments     Mar 16, 2017  4:40 PM   Established Patient with Hannah Garcia M.D.   [facility] ([facility])    [address]  [facility] [address] [phone]           You will be receiving a confirmation call a few days before your appointment from our automated call confirmation system.              Problem List              ICD-10-CM Priority Class Noted - Resolved    Essential hypertension I10   10/22/2015 - Present    Arthritis of neck (CMS-HCC) M46.92   10/22/2015 - Present    Allergic rhinitis due to pollen J30.1   10/22/2015 - Present    Preventative health care Z00.00   2016 - Present    Bilateral hip joint arthritis M19.90   2016 - Present    Dyslipidemia, goal LDL below 100 E78.5   2016 - Present    Obesity (BMI 30-39.9) E66.9   2016 - Present    Acute maxillary sinusitis J01.00   8/3/2016 - Present    Changing pigmented skin lesion D22.9   2017 - Present   "   Health Maintenance        Date Due Completion Dates    COLONOSCOPY 6/26/1999 ---    IMM INFLUENZA (1) 9/1/2016 10/22/2015    IMM DTaP/Tdap/Td Vaccine (2 - Td) 1/21/2020 1/21/2010            Current Immunizations     13-VALENT PCV PREVNAR 5/5/2015    Influenza Vaccine Adult HD 10/22/2015    Pneumococcal polysaccharide vaccine (PPSV-23) 10/22/2015    SHINGLES VACCINE 5/5/2015    Tdap Vaccine 1/21/2010      Below and/or attached are the medications your provider expects you to take. Review all of your home medications and newly ordered medications with your provider and/or pharmacist. Follow medication instructions as directed by your provider and/or pharmacist. Please keep your medication list with you and share with your provider. Update the information when medications are discontinued, doses are changed, or new medications (including over-the-counter products) are added; and carry medication information at all times in the event of emergency situations     Allergies:  CODEINE - Rash,Itching     SHELLFISH ALLERGY - Diarrhea,Vomiting               Medications  Valid as of: March 10, 2017 - 11:56 AM    Generic Name Brand Name Tablet Size Instructions for use    Azithromycin (Tab) ZITHROMAX 250 MG 2 tabs PO today then 1 tab PO daily until gone        Cetirizine HCl   Take  by mouth.        Cholecalciferol (Cap) Vitamin D 2000 UNITS Take  by mouth every day.        Ibuprofen (Tab) MOTRIN 800 MG Take 1 Tab by mouth 2 Times a Day.        Lisinopril-Hydrochlorothiazide (Tab) PRINZIDE, ZESTORETIC 10-12.5 MG Take 1 Tab by mouth every day.        .                 Medicines prescribed today were sent to:     [address] [place] [id number] [facility] [place] [address]    Phone: [phone] Fax: [phone]    Open 24 Hours?: No      Medication refill instructions:       If your prescription bottle indicates you have medication refills left, it is not necessary to call your provider’s  office. Please contact your pharmacy and they will refill your medication.    If your prescription bottle indicates you do not have any refills left, you may request refills at any time through one of the following ways: The online [employer] system (except Urgent Care), by calling your provider’s office, or by asking your pharmacy to contact your provider’s office with a refill request. Medication refills are processed only during regular business hours and may not be available until the next business day. Your provider may request additional information or to have a follow-up visit with you prior to refilling your medication.   *Please Note: Medication refills are assigned a new Rx number when refilled electronically. Your pharmacy may indicate that no refills were authorized even though a new prescription for the same medication is available at the pharmacy. Please request the medicine by name with the pharmacy before contacting your provider for a refill.           [employer] Access Code: Activation code not generated  Current [employer] Status: Active

## 2017-03-10 NOTE — PROGRESS NOTES
"Subjective:     Chief Complaint   Patient presents with   • Cough     x1 week    • Pharyngitis     x1 week      Gaudencio Berry is a 66 y.o. male established patient of Dr. Garcia here for evaluation of cough, congestion, sore throat. Initially developed sore throat, nasal congestion one week ago, cough develop shortly thereafter. Cough is frequent, productive of sputum, he is unsure of the color. He continues to have significant nasal congestion, blood-tinged mucus. Slightly more short of breath than normal, feeling fatigued and has loss of appetite. Subjective fever, chills. He's been taking Mucinex which provides slight relief. Denies chest pain, nausea, vomiting, focal facial pain. No history of asthma, COPD. Remote history of tobacco use, quit in 1982  Current medicines (including changes today)  Current Outpatient Prescriptions   Medication Sig Dispense Refill   • azithromycin (ZITHROMAX) 250 MG Tab 2 tabs PO today then 1 tab PO daily until gone 6 Tab 0   • ibuprofen (MOTRIN) 800 MG Tab Take 1 Tab by mouth 2 Times a Day. 180 Tab 3   • lisinopril-hydrochlorothiazide (PRINZIDE, ZESTORETIC) 10-12.5 MG per tablet Take 1 Tab by mouth every day. 90 Tab 3   • Cetirizine HCl (KLS ALLER-ALIA PO) Take  by mouth.     • Cholecalciferol (VITAMIN D) 2000 UNITS Cap Take  by mouth every day.       No current facility-administered medications for this visit.     He  has a past medical history of Hypertension; Cataract; Allergy; Arthritis; and Hyperlipidemia.    ROS included above     Objective:     Blood pressure 130/72, pulse 80, temperature 36.6 °C (97.9 °F), height 1.88 m (6' 2.02\"), weight 127.914 kg (282 lb), SpO2 97 %. Body mass index is 36.19 kg/(m^2).     Physical Exam:  General: Alert, oriented in no acute distress.  Eye contact is good, speech is normal, affect calm  HEENT: Oral mucosa pink moist, no lesions. Posterior oropharynx with erythema, no lesions or exudate. TMs gray with good landmarks bilaterally. Mild bilateral " tonsillar lymphadenopathy  Lungs: Decreased in bilateral bases, otherwise clear, normal effort, no wheeze/ rhonchi/ rales.  CV: regular rate and rhythm, S1, S2, no murmur  Abdomen: soft, nontender  Ext: no edema, color normal, vascularity normal, temperature normal    Assessment and Plan:   The following treatment plan was discussed   1. Cough   cough for one week duration with recent worsening, decreased air movement in bilateral bases and fatigue. Will cover for potential bacterial component with azithromycin. Encouraged continue with Mucinex, increase fluids, rest that become OTC analgesic as needed. Reevaluation if worsening  azithromycin (ZITHROMAX) 250 MG Tab   2. Abnormal breath sounds  azithromycin (ZITHROMAX) 250 MG Tab       Followup: As needed         Please note that this dictation was created using voice recognition software. I have worked with consultants from the vendor as well as technical experts from [facility] to optimize the interface. I have made every reasonable attempt to correct obvious errors, but I expect that there are errors of grammar and possibly content that I did not discover before finalizing the note.

## 2017-03-16 ENCOUNTER — OFFICE VISIT (OUTPATIENT)
Dept: MEDICAL GROUP | Facility: MEDICAL CENTER | Age: 67
End: 2017-03-16
Payer: MEDICARE

## 2017-03-16 VITALS
TEMPERATURE: 98.3 F | HEIGHT: 74 IN | OXYGEN SATURATION: 95 % | SYSTOLIC BLOOD PRESSURE: 126 MMHG | DIASTOLIC BLOOD PRESSURE: 86 MMHG | HEART RATE: 73 BPM

## 2017-03-16 DIAGNOSIS — J32.9 SINOBRONCHITIS: ICD-10-CM

## 2017-03-16 DIAGNOSIS — Z48.89 SUTURE CHECK: ICD-10-CM

## 2017-03-16 DIAGNOSIS — J40 SINOBRONCHITIS: ICD-10-CM

## 2017-03-16 DIAGNOSIS — D23.61 DYSPLASTIC NEVUS OF RIGHT UPPER EXTREMITY: ICD-10-CM

## 2017-03-16 PROCEDURE — 1036F TOBACCO NON-USER: CPT | Performed by: FAMILY MEDICINE

## 2017-03-16 PROCEDURE — G8432 DEP SCR NOT DOC, RNG: HCPCS | Performed by: FAMILY MEDICINE

## 2017-03-16 PROCEDURE — 4040F PNEUMOC VAC/ADMIN/RCVD: CPT | Performed by: FAMILY MEDICINE

## 2017-03-16 PROCEDURE — 1101F PT FALLS ASSESS-DOCD LE1/YR: CPT | Performed by: FAMILY MEDICINE

## 2017-03-16 PROCEDURE — 99212 OFFICE O/P EST SF 10 MIN: CPT | Performed by: FAMILY MEDICINE

## 2017-03-16 PROCEDURE — G8419 CALC BMI OUT NRM PARAM NOF/U: HCPCS | Performed by: FAMILY MEDICINE

## 2017-03-16 PROCEDURE — G8484 FLU IMMUNIZE NO ADMIN: HCPCS | Performed by: FAMILY MEDICINE

## 2017-03-16 RX ORDER — AMOXICILLIN AND CLAVULANATE POTASSIUM 875; 125 MG/1; MG/1
1 TABLET, FILM COATED ORAL 2 TIMES DAILY
Qty: 20 TAB | Refills: 0 | Status: SHIPPED | OUTPATIENT
Start: 2017-03-16 | End: 2017-07-13

## 2017-03-16 NOTE — MR AVS SNAPSHOT
"        Gaudencio Berry   3/16/2017 4:40 PM   Office Visit   MRN: [id number]    Department:  [facility]   Dept Phone:  [phone]    Description:  Male : 1950   Provider:  Hannah Garcia M.D.           Reason for Visit     Suture / Staple Removal           Allergies as of 3/16/2017     Allergen Noted Reactions    Codeine 10/22/2015   Rash, Itching    Per patient, itch all over body, rash    Shellfish Allergy 10/22/2015   Diarrhea, Vomiting    Per patient, GI issues, vomiting, diarrhea      You were diagnosed with     Sinobronchitis   [491984]       Suture check   [[id number]]       Dysplastic nevus of right upper extremity   [0406345]         Vital Signs     Blood Pressure Pulse Temperature Height Oxygen Saturation Smoking Status    126/86 mmHg 73 36.8 °C (98.3 °F) 1.885 m (6' 2.21\") 95% Former Smoker      Basic Information     Date Of Birth Sex Race Ethnicity Preferred Language    1950 Male White Non- English      Problem List              ICD-10-CM Priority Class Noted - Resolved    Essential hypertension I10   10/22/2015 - Present    Arthritis of neck (CMS-HCC) M46.92   10/22/2015 - Present    Allergic rhinitis due to pollen J30.1   10/22/2015 - Present    Preventative health care Z00.00   2016 - Present    Bilateral hip joint arthritis M19.90   2016 - Present    Dyslipidemia, goal LDL below 100 E78.5   2016 - Present    Obesity (BMI 30-39.9) E66.9   2016 - Present    Acute maxillary sinusitis J01.00   8/3/2016 - Present    Changing pigmented skin lesion D22.9   2017 - Present    Sinobronchitis J32.9, J40   3/16/2017 - Present    Dysplastic nevus of right upper extremity D22.61   3/16/2017 - Present      Health Maintenance        Date Due Completion Dates    COLONOSCOPY 1999 ---    IMM INFLUENZA (1) 2016 10/22/2015    IMM DTaP/Tdap/Td Vaccine (2 - Td) 2020            Current Immunizations     13-VALENT PCV PREVNAR 2015    Influenza " Vaccine Adult HD 10/22/2015    Pneumococcal polysaccharide vaccine (PPSV-23) 10/22/2015    SHINGLES VACCINE 5/5/2015    Tdap Vaccine 1/21/2010      Below and/or attached are the medications your provider expects you to take. Review all of your home medications and newly ordered medications with your provider and/or pharmacist. Follow medication instructions as directed by your provider and/or pharmacist. Please keep your medication list with you and share with your provider. Update the information when medications are discontinued, doses are changed, or new medications (including over-the-counter products) are added; and carry medication information at all times in the event of emergency situations     Allergies:  CODEINE - Rash,Itching     SHELLFISH ALLERGY - Diarrhea,Vomiting               Medications  Valid as of: March 16, 2017 -  4:49 PM    Generic Name Brand Name Tablet Size Instructions for use    Amoxicillin-Pot Clavulanate (Tab) AUGMENTIN 875-125 MG Take 1 Tab by mouth 2 times a day.        Azithromycin (Tab) ZITHROMAX 250 MG 2 tabs PO today then 1 tab PO daily until gone        Cetirizine HCl   Take  by mouth.        Cholecalciferol (Cap) Vitamin D 2000 UNITS Take  by mouth every day.        Ibuprofen (Tab) MOTRIN 800 MG Take 1 Tab by mouth 2 Times a Day.        Lisinopril-Hydrochlorothiazide (Tab) PRINZIDE, ZESTORETIC 10-12.5 MG Take 1 Tab by mouth every day.        .                 Medicines prescribed today were sent to:     [facility] [address] [place] [address] (S), [place] - [address] [address] [facility] (S) [place] [address]    Phone: [phone] Fax: [phone]    Open 24 Hours?: No      Medication refill instructions:       If your prescription bottle indicates you have medication refills left, it is not necessary to call your provider’s office. Please contact your pharmacy and they will refill your medication.    If your prescription bottle indicates you do not have any refills left, you may request  refills at any time through one of the following ways: The online [employer] system (except Urgent Care), by calling your provider’s office, or by asking your pharmacy to contact your provider’s office with a refill request. Medication refills are processed only during regular business hours and may not be available until the next business day. Your provider may request additional information or to have a follow-up visit with you prior to refilling your medication.   *Please Note: Medication refills are assigned a new Rx number when refilled electronically. Your pharmacy may indicate that no refills were authorized even though a new prescription for the same medication is available at the pharmacy. Please request the medicine by name with the pharmacy before contacting your provider for a refill.           [employer] Access Code: Activation code not generated  Current [employer] Status: Active

## 2017-03-22 NOTE — ASSESSMENT & PLAN NOTE
Patient is here for suture removal following a excisional biopsy for what was a dysplastic nevus of the right upper extremities. Margins were clear. Patient reports the wounds healing well without any erythema or exudate.

## 2017-03-22 NOTE — PROGRESS NOTES
Subjective:     Chief Complaint   Patient presents with   • Suture / Staple Removal       Gaudencio Berry is a 66 y.o. male here today for evaluation and management of:    Sinobronchitis  Patient complains of a 10 day history of nasal congestion and cough. He is complaining of pain in his sinuses with green rhinorrhea. He's had postnasal drainage and a sore throat. He denies any fever or chills. No body ache. His symptoms seem to be worsening rather than improving.    Dysplastic nevus of right upper extremity  Patient is here for suture removal following a excisional biopsy for what was a dysplastic nevus of the right upper extremities. Margins were clear. Patient reports the wounds healing well without any erythema or exudate.       Allergies   Allergen Reactions   • Codeine Rash and Itching     Per patient, itch all over body, rash   • Shellfish Allergy Diarrhea and Vomiting     Per patient, GI issues, vomiting, diarrhea       Current medicines (including changes today)  Current Outpatient Prescriptions   Medication Sig Dispense Refill   • amoxicillin-clavulanate (AUGMENTIN) 875-125 MG Tab Take 1 Tab by mouth 2 times a day. 20 Tab 0   • Cetirizine HCl (KLS ALLER-ALIA PO) Take  by mouth.     • ibuprofen (MOTRIN) 800 MG Tab Take 1 Tab by mouth 2 Times a Day. 180 Tab 3   • lisinopril-hydrochlorothiazide (PRINZIDE, ZESTORETIC) 10-12.5 MG per tablet Take 1 Tab by mouth every day. 90 Tab 3   • Cholecalciferol (VITAMIN D) 2000 UNITS Cap Take  by mouth every day.     • azithromycin (ZITHROMAX) 250 MG Tab 2 tabs PO today then 1 tab PO daily until gone 6 Tab 0     No current facility-administered medications for this visit.       He  has a past medical history of Hypertension; Cataract; Allergy; Arthritis; and Hyperlipidemia.    Patient Active Problem List    Diagnosis Date Noted   • Sinobronchitis 03/16/2017   • Dysplastic nevus of right upper extremity 03/16/2017   • Changing pigmented skin lesion 02/24/2017   • Acute  "maxillary sinusitis 08/03/2016   • Dyslipidemia, goal LDL below 100 07/20/2016   • Obesity (BMI 30-39.9) 07/20/2016   • Preventative health care 06/28/2016   • Bilateral hip joint arthritis 06/28/2016   • Essential hypertension 10/22/2015   • Arthritis of neck (CMS-HCC) 10/22/2015   • Allergic rhinitis due to pollen 10/22/2015        ROS   No fever or chills.  No nausea or vomiting.  No chest pain or palpitations.  No  SOB.  No pain with urination or hematuria.  No black or bloody stools.       Objective:     Blood pressure 126/86, pulse 73, temperature 36.8 °C (98.3 °F), height 1.885 m (6' 2.21\"), SpO2 95 %. There is no weight on file to calculate BMI.   Physical Exam:  Well developed, well nourished.  Alert, oriented in no acute distress.  Eye contact is good, speech goal directed, affect calm  Eyes: conjunctiva non-injected, sclera non-icteric.  Ears: Pinna normal. TM pearly gray.   Nose: Nares are patent. Erythematous swollen mucosa with yellow drainage  Mouth: Oral mucous membranes pink and moist with no lesions. Posterior pharynx with erythema and yellow postnasal drainage  Neck Supple.  No adenopathy or masses in the neck or supraclavicular regions. No thyromegaly  Lungs: clear to auscultation bilaterally with good excursion. No wheezes or rhonchi  CV: regular rate and rhythm. No murmur  Sinuses nontender to percussion. No swelling.  Skin: Incision is well-healed with no erythema or induration          Assessment and Plan:   The following treatment plan was discussed    1. Sinobronchitis  Increase fluids and rest. Call if not improving  - amoxicillin-clavulanate (AUGMENTIN) 875-125 MG Tab; Take 1 Tab by mouth 2 times a day.  Dispense: 20 Tab; Refill: 0    2. Suture check  Sutures removed without difficulty. Wound care discussed    3. Dysplastic nevus of right upper extremity  Lesion removed in its entirety. No follow-up needed.    Any change or worsening of signs or symptoms, patient encouraged to follow-up " or report to the emergency room for further evaluation. Patient understands and agrees.    Followup: Return if symptoms worsen or fail to improve.

## 2017-03-22 NOTE — ASSESSMENT & PLAN NOTE
Patient complains of a 10 day history of nasal congestion and cough. He is complaining of pain in his sinuses with green rhinorrhea. He's had postnasal drainage and a sore throat. He denies any fever or chills. No body ache. His symptoms seem to be worsening rather than improving.

## 2017-07-13 ENCOUNTER — OFFICE VISIT (OUTPATIENT)
Dept: MEDICAL GROUP | Facility: MEDICAL CENTER | Age: 67
End: 2017-07-13
Payer: MEDICARE

## 2017-07-13 VITALS
TEMPERATURE: 98.1 F | BODY MASS INDEX: 35.16 KG/M2 | SYSTOLIC BLOOD PRESSURE: 122 MMHG | HEIGHT: 74 IN | HEART RATE: 99 BPM | DIASTOLIC BLOOD PRESSURE: 72 MMHG | WEIGHT: 274 LBS | OXYGEN SATURATION: 95 %

## 2017-07-13 DIAGNOSIS — S90.931A: ICD-10-CM

## 2017-07-13 DIAGNOSIS — J40 BRONCHITIS: ICD-10-CM

## 2017-07-13 PROCEDURE — 99214 OFFICE O/P EST MOD 30 MIN: CPT | Performed by: FAMILY MEDICINE

## 2017-07-13 RX ORDER — ALBUTEROL SULFATE 90 UG/1
2 AEROSOL, METERED RESPIRATORY (INHALATION) EVERY 6 HOURS PRN
Qty: 8.5 G | Refills: 0 | Status: SHIPPED | OUTPATIENT
Start: 2017-07-13 | End: 2017-11-30 | Stop reason: SDUPTHER

## 2017-07-13 RX ORDER — AMOXICILLIN AND CLAVULANATE POTASSIUM 875; 125 MG/1; MG/1
1 TABLET, FILM COATED ORAL 2 TIMES DAILY
Qty: 20 TAB | Refills: 0 | Status: SHIPPED | OUTPATIENT
Start: 2017-07-13 | End: 2017-11-30 | Stop reason: SDUPTHER

## 2017-07-13 NOTE — MR AVS SNAPSHOT
"        Gaudencio Berry   2017 4:40 PM   Office Visit   MRN: [id number]    Department:  [facility]   Dept Phone:  [phone]    Description:  Male : 1950   Provider:  Hannah Garcia M.D.           Reason for Visit     Cough for 3 weeks    Congestion chest    Nail Problem on right big toe      Allergies as of 2017     Allergen Noted Reactions    Codeine 10/22/2015   Rash, Itching    Per patient, itch all over body, rash    Shellfish Allergy 10/22/2015   Diarrhea, Vomiting    Per patient, GI issues, vomiting, diarrhea      You were diagnosed with     Bronchitis   [[id number]]       Superficial injury of right great toe, initial encounter   [[id number]]         Vital Signs     Blood Pressure Pulse Temperature Height Weight Body Mass Index    122/72 mmHg 99 36.7 °C (98.1 °F) 1.885 m (6' 2.21\") 124.286 kg (274 lb) 34.98 kg/m2    Oxygen Saturation Smoking Status                95% Former Smoker          Basic Information     Date Of Birth Sex Race Ethnicity Preferred Language    1950 Male White Non- English      Problem List              ICD-10-CM Priority Class Noted - Resolved    Essential hypertension I10   10/22/2015 - Present    Arthritis of neck M46.92   10/22/2015 - Present    Allergic rhinitis due to pollen J30.1   10/22/2015 - Present    Preventative health care Z00.00   2016 - Present    Bilateral hip joint arthritis M16.0   2016 - Present    Dyslipidemia, goal LDL below 100 E78.5   2016 - Present    Obesity (BMI 30-39.9) E66.9   2016 - Present    Acute maxillary sinusitis J01.00   8/3/2016 - Present    Changing pigmented skin lesion D22.9   2017 - Present    Bronchitis J40   3/16/2017 - Present    Dysplastic nevus of right upper extremity D23.61   3/16/2017 - Present    Superficial injury of right great toe S90.931A   2017 - Present      Health Maintenance        Date Due Completion Dates    COLONOSCOPY 1999 ---    IMM INFLUENZA (1) 2017 " 10/22/2015    IMM DTaP/Tdap/Td Vaccine (2 - Td) 1/21/2020 1/21/2010            Current Immunizations     13-VALENT PCV PREVNAR 5/5/2015    Influenza Vaccine Adult HD 10/22/2015    Pneumococcal polysaccharide vaccine (PPSV-23) 10/22/2015    SHINGLES VACCINE 5/5/2015    Tdap Vaccine 1/21/2010      Below and/or attached are the medications your provider expects you to take. Review all of your home medications and newly ordered medications with your provider and/or pharmacist. Follow medication instructions as directed by your provider and/or pharmacist. Please keep your medication list with you and share with your provider. Update the information when medications are discontinued, doses are changed, or new medications (including over-the-counter products) are added; and carry medication information at all times in the event of emergency situations     Allergies:  CODEINE - Rash,Itching     SHELLFISH ALLERGY - Diarrhea,Vomiting               Medications  Valid as of: July 13, 2017 -  4:52 PM    Generic Name Brand Name Tablet Size Instructions for use    Albuterol Sulfate (Aero Soln) albuterol 108 (90 BASE) MCG/ACT Inhale 2 Puffs by mouth every 6 hours as needed for Shortness of Breath.        Amoxicillin-Pot Clavulanate (Tab) AUGMENTIN 875-125 MG Take 1 Tab by mouth 2 times a day.        Cetirizine HCl   Take  by mouth.        Cholecalciferol (Cap) Vitamin D 2000 UNITS Take  by mouth every day.        Ibuprofen (Tab) MOTRIN 800 MG Take 1 Tab by mouth 2 Times a Day.        Lisinopril-Hydrochlorothiazide (Tab) PRINZIDE, ZESTORETIC 10-12.5 MG Take 1 Tab by mouth every day.        .                 Medicines prescribed today were sent to:     [facility] [place] (S), [place] - [email or web address] [id number] [facility] [place] ([place]) [place] [address]    Phone: [phone] Fax: [phone]    Open 24 Hours?: No      Medication refill instructions:       If your prescription bottle indicates you have medication refills left, it is not  necessary to call your provider’s office. Please contact your pharmacy and they will refill your medication.    If your prescription bottle indicates you do not have any refills left, you may request refills at any time through one of the following ways: The online [email or web address] system (except Urgent Care), by calling your provider’s office, or by asking your pharmacy to contact your provider’s office with a refill request. Medication refills are processed only during regular business hours and may not be available until the next business day. Your provider may request additional information or to have a follow-up visit with you prior to refilling your medication.   *Please Note: Medication refills are assigned a new Rx number when refilled electronically. Your pharmacy may indicate that no refills were authorized even though a new prescription for the same medication is available at the pharmacy. Please request the medicine by name with the pharmacy before contacting your provider for a refill.           [email or web address] Access Code: Activation code not generated  Current [email or web address] Status: Active

## 2017-07-14 NOTE — ASSESSMENT & PLAN NOTE
Patient complains of a split in his right great toe. He has a history of onychomycosis and was on medication for 6 months while at [place]. He simply this area split open and he has not been able to get it to heal. There is now some surrounding redness

## 2017-07-14 NOTE — ASSESSMENT & PLAN NOTE
Illness: 3 weeks of illness including: nasal congestion, green/purulent rhinorrhea, sore throat, laryngitis, cough ,  Sinus pain and pressure: bilateral frontal  Symptoms negative for night sweats, hemoptysis,   Treatments tried: OTC   Since onset, symptoms are worse   Similarly ill exposures: yes  Medical history negative for asthma  He  reports that he quit smoking about 35 years ago. His smoking use included Cigarettes. He has a 7.5 pack-year smoking history. He has never used smokeless tobacco.

## 2017-07-14 NOTE — PROGRESS NOTES
Subjective:     Chief Complaint   Patient presents with   • Cough     for 3 weeks   • Congestion     chest   • Nail Problem     on right big toe       Gaudencio Berry is a 67 y.o. male here today for evaluation and management of:    Bronchitis  Illness: 3 weeks of illness including: nasal congestion, green/purulent rhinorrhea, sore throat, laryngitis, cough ,  Sinus pain and pressure: bilateral frontal  Symptoms negative for night sweats, hemoptysis,   Treatments tried: OTC   Since onset, symptoms are worse   Similarly ill exposures: yes  Medical history negative for asthma  He  reports that he quit smoking about 35 years ago. His smoking use included Cigarettes. He has a 7.5 pack-year smoking history. He has never used smokeless tobacco.      Superficial injury of right great toe  Patient complains of a split in his right great toe. He has a history of onychomycosis and was on medication for 6 months while at [place]. He simply this area split open and he has not been able to get it to heal. There is now some surrounding redness       Allergies   Allergen Reactions   • Codeine Rash and Itching     Per patient, itch all over body, rash   • Shellfish Allergy Diarrhea and Vomiting     Per patient, GI issues, vomiting, diarrhea       Current medicines (including changes today)  Current Outpatient Prescriptions   Medication Sig Dispense Refill   • amoxicillin-clavulanate (AUGMENTIN) 875-125 MG Tab Take 1 Tab by mouth 2 times a day. 20 Tab 0   • albuterol 108 (90 BASE) MCG/ACT Aero Soln inhalation aerosol Inhale 2 Puffs by mouth every 6 hours as needed for Shortness of Breath. 8.5 g 0   • Cetirizine HCl (KLS ALLER-ALIA PO) Take  by mouth.     • ibuprofen (MOTRIN) 800 MG Tab Take 1 Tab by mouth 2 Times a Day. 180 Tab 3   • lisinopril-hydrochlorothiazide (PRINZIDE, ZESTORETIC) 10-12.5 MG per tablet Take 1 Tab by mouth every day. 90 Tab 3   • Cholecalciferol (VITAMIN D) 2000 UNITS Cap Take  by mouth every day.       No current  "facility-administered medications for this visit.       He  has a past medical history of Hypertension; Cataract; Allergy; Arthritis; and Hyperlipidemia.    Patient Active Problem List    Diagnosis Date Noted   • Superficial injury of right great toe 07/13/2017   • Bronchitis 03/16/2017   • Dysplastic nevus of right upper extremity 03/16/2017   • Changing pigmented skin lesion 02/24/2017   • Acute maxillary sinusitis 08/03/2016   • Dyslipidemia, goal LDL below 100 07/20/2016   • Obesity (BMI 30-39.9) 07/20/2016   • Preventative health care 06/28/2016   • Bilateral hip joint arthritis 06/28/2016   • Essential hypertension 10/22/2015   • Arthritis of neck 10/22/2015   • Allergic rhinitis due to pollen 10/22/2015       ROS   No fever or chills.  No nausea or vomiting.  No chest pain or palpitations.   No pain with urination or hematuria.  No black or bloody stools.       Objective:     Blood pressure 122/72, pulse 99, temperature 36.7 °C (98.1 °F), height 1.885 m (6' 2.21\"), weight 124.286 kg (274 lb), SpO2 95 %. Body mass index is 34.98 kg/(m^2).   Physical Exam:  Well developed, well nourished.  Alert, oriented in no acute distress.  Eye contact is good, speech goal directed, affect calm  Eyes: conjunctiva non-injected, sclera non-icteric.  Ears: Pinna normal. TM pearly gray.   Nose: Nares are patent. Erythematous, swollen mucosa with yellow discharge   Mouth: Oral mucous membranes pink and moist with no lesions. Mild diffuse erythema in the posterior pharynx  Neck Supple.  No adenopathy or masses in the neck or supraclavicular regions. No thyromegaly  Lungs: clear to auscultation bilaterally with good excursion. No wheezes or rhonchi  CV: regular rate and rhythm. No murmur  Right great toe with one and half centimeter superficial injury with some surrounding erythema and induration without drainage or fluctuance.      Assessment and Plan:   The following treatment plan was discussed    1. Bronchitis  Augmentin as " directed. Albuterol as needed. Increase fluids and rest  - amoxicillin-clavulanate (AUGMENTIN) 875-125 MG Tab; Take 1 Tab by mouth 2 times a day.  Dispense: 20 Tab; Refill: 0  - albuterol 108 (90 BASE) MCG/ACT Aero Soln inhalation aerosol; Inhale 2 Puffs by mouth every 6 hours as needed for Shortness of Breath.  Dispense: 8.5 g; Refill: 0    2. Superficial injury of right great toe, initial encounter  Keep clean and dry. Moisturize regularly. Augmentin should resolve the issue.  - amoxicillin-clavulanate (AUGMENTIN) 875-125 MG Tab; Take 1 Tab by mouth 2 times a day.  Dispense: 20 Tab; Refill: 0    Any change or worsening of signs or symptoms, patient encouraged to follow-up or report to the emergency room for further evaluation. Patient understands and agrees.    Followup: Return if symptoms worsen or fail to improve.

## 2017-10-03 DIAGNOSIS — I10 ESSENTIAL HYPERTENSION: ICD-10-CM

## 2017-10-03 RX ORDER — LISINOPRIL AND HYDROCHLOROTHIAZIDE 12.5; 1 MG/1; MG/1
1 TABLET ORAL DAILY
Qty: 90 TAB | Refills: 3 | Status: SHIPPED | OUTPATIENT
Start: 2017-10-03 | End: 2018-09-26 | Stop reason: SDUPTHER

## 2017-10-03 NOTE — TELEPHONE ENCOUNTER
Was the patient seen in the last year in this department? Yes     Does patient have an active prescription for medications requested? No     Received Request Via: Patient     Pt called and LM stating he is out of his medication that was previously prescribed by Cony Judge.

## 2017-11-30 ENCOUNTER — OFFICE VISIT (OUTPATIENT)
Dept: MEDICAL GROUP | Facility: MEDICAL CENTER | Age: 67
End: 2017-11-30
Payer: MEDICARE

## 2017-11-30 VITALS
TEMPERATURE: 98.4 F | WEIGHT: 273 LBS | SYSTOLIC BLOOD PRESSURE: 138 MMHG | DIASTOLIC BLOOD PRESSURE: 78 MMHG | BODY MASS INDEX: 35.04 KG/M2 | HEART RATE: 65 BPM | HEIGHT: 74 IN | OXYGEN SATURATION: 95 %

## 2017-11-30 DIAGNOSIS — J40 SINOBRONCHITIS: ICD-10-CM

## 2017-11-30 DIAGNOSIS — J32.9 SINOBRONCHITIS: ICD-10-CM

## 2017-11-30 PROBLEM — J06.9 VIRAL UPPER RESPIRATORY TRACT INFECTION: Status: ACTIVE | Noted: 2017-11-30

## 2017-11-30 PROCEDURE — 99214 OFFICE O/P EST MOD 30 MIN: CPT | Performed by: FAMILY MEDICINE

## 2017-11-30 RX ORDER — ALBUTEROL SULFATE 90 UG/1
2 AEROSOL, METERED RESPIRATORY (INHALATION) EVERY 6 HOURS PRN
Qty: 8.5 G | Refills: 1 | Status: SHIPPED | OUTPATIENT
Start: 2017-11-30 | End: 2018-06-29 | Stop reason: SDUPTHER

## 2017-11-30 RX ORDER — AMOXICILLIN AND CLAVULANATE POTASSIUM 875; 125 MG/1; MG/1
1 TABLET, FILM COATED ORAL 2 TIMES DAILY
Qty: 20 TAB | Refills: 0 | Status: SHIPPED | OUTPATIENT
Start: 2017-11-30 | End: 2018-10-30

## 2017-11-30 NOTE — PROGRESS NOTES
Subjective:     Chief Complaint   Patient presents with   • Sinus Problem     possible infection        Gaudencio Berry is a 67 y.o. male here today for evaluation and management of:    Sinobronchitis  Illness: 8 days of illness including: nasal congestion, green/purulent rhinorrhea, ear pain/congestion, cough ,  Sinus pain and pressure: bilateral frontal  Symptoms negative for fever,   Treatments tried: Muccinex, Ibuprofen   Since onset, symptoms are worse   Similarly ill exposures: yes  Medical history negative for asthma  He  reports that he quit smoking about 35 years ago. His smoking use included Cigarettes. He has a 7.50 pack-year smoking history. He has never used smokeless tobacco.         Allergies   Allergen Reactions   • Codeine Rash and Itching     Per patient, itch all over body, rash   • Shellfish Allergy Diarrhea and Vomiting     Per patient, GI issues, vomiting, diarrhea       Current medicines (including changes today)  Current Outpatient Prescriptions   Medication Sig Dispense Refill   • albuterol 108 (90 Base) MCG/ACT Aero Soln inhalation aerosol Inhale 2 Puffs by mouth every 6 hours as needed for Shortness of Breath. 8.5 g 1   • amoxicillin-clavulanate (AUGMENTIN) 875-125 MG Tab Take 1 Tab by mouth 2 times a day. 20 Tab 0   • lisinopril-hydrochlorothiazide (PRINZIDE, ZESTORETIC) 10-12.5 MG per tablet Take 1 Tab by mouth every day. 90 Tab 3   • ibuprofen (MOTRIN) 800 MG Tab Take 1 Tab by mouth 2 Times a Day. 180 Tab 3   • Cetirizine HCl (KLS ALLER-ALIA PO) Take  by mouth.     • Cholecalciferol (VITAMIN D) 2000 UNITS Cap Take  by mouth every day.       No current facility-administered medications for this visit.        He  has a past medical history of Allergy; Arthritis; Cataract; Hyperlipidemia; and Hypertension.    Patient Active Problem List    Diagnosis Date Noted   • Sinobronchitis 11/30/2017   • Superficial injury of right great toe 07/13/2017   • Bronchitis 03/16/2017   • Dysplastic nevus of  "right upper extremity 03/16/2017   • Changing pigmented skin lesion 02/24/2017   • Acute maxillary sinusitis 08/03/2016   • Dyslipidemia, goal LDL below 100 07/20/2016   • Obesity (BMI 30-39.9) 07/20/2016   • Preventative health care 06/28/2016   • Bilateral hip joint arthritis 06/28/2016   • Essential hypertension 10/22/2015   • Arthritis of neck (CMS-[facility]) 10/22/2015   • Allergic rhinitis due to pollen 10/22/2015       ROS   No fever or chills.  No nausea or vomiting.  No chest pain or palpitations.  No SOB.  No pain with urination or hematuria.  No black or bloody stools.       Objective:     Blood pressure 138/78, pulse 65, temperature 36.9 °C (98.4 °F), height 1.885 m (6' 2.21\"), weight 123.8 kg (273 lb), SpO2 95 %. Body mass index is 34.85 kg/m².   Physical Exam:  Well developed, well nourished.  Alert, oriented in no acute distress.  Eye contact is good, speech goal directed, affect calm  Eyes: conjunctiva non-injected, sclera non-icteric.  Ears: Pinna normal. TM pearly gray.   Nose: Nares are patent.  Erythematous, swollen mucosa with yellow discharge  Mouth: Oral mucous membranes pink and moist with no lesions. Diffuse moderate erythema of the posterior pharynx  Neck Supple.  No adenopathy or masses in the neck or supraclavicular regions. No thyromegaly  Lungs: clear to auscultation bilaterally with good excursion. No wheezes or rhonchi  CV: regular rate and rhythm. No murmur        Assessment and Plan:   The following treatment plan was discussed    1. Sinobronchitis  Augmentin as directed. Patient requesting refill of albuterol. Increase fluids and rest call if not improving  - albuterol 108 (90 Base) MCG/ACT Aero Soln inhalation aerosol; Inhale 2 Puffs by mouth every 6 hours as needed for Shortness of Breath.  Dispense: 8.5 g; Refill: 1  - amoxicillin-clavulanate (AUGMENTIN) 875-125 MG Tab; Take 1 Tab by mouth 2 times a day.  Dispense: 20 Tab; Refill: 0    Any change or worsening of signs or symptoms, " patient encouraged to follow-up or report to the emergency room for further evaluation. Patient understands and agrees.    Followup: Return if symptoms worsen or fail to improve.

## 2017-11-30 NOTE — PATIENT INSTRUCTIONS

## 2017-11-30 NOTE — ASSESSMENT & PLAN NOTE
Illness: 8 days of illness including: nasal congestion, green/purulent rhinorrhea, ear pain/congestion, cough ,  Sinus pain and pressure: bilateral frontal  Symptoms negative for fever,   Treatments tried: Muccinex, Ibuprofen   Since onset, symptoms are worse   Similarly ill exposures: yes  Medical history negative for asthma  He  reports that he quit smoking about 35 years ago. His smoking use included Cigarettes. He has a 7.50 pack-year smoking history. He has never used smokeless tobacco.

## 2018-04-27 DIAGNOSIS — M16.0 BILATERAL HIP JOINT ARTHRITIS: ICD-10-CM

## 2018-04-27 DIAGNOSIS — M47.812 ARTHRITIS OF NECK: ICD-10-CM

## 2018-04-27 RX ORDER — IBUPROFEN 800 MG/1
800 TABLET ORAL 2 TIMES DAILY
Qty: 180 TAB | Refills: 3 | OUTPATIENT
Start: 2018-04-27

## 2018-04-30 ENCOUNTER — PATIENT MESSAGE (OUTPATIENT)
Dept: MEDICAL GROUP | Facility: MEDICAL CENTER | Age: 68
End: 2018-04-30

## 2018-04-30 DIAGNOSIS — Z13.0 SCREENING FOR DEFICIENCY ANEMIA: ICD-10-CM

## 2018-04-30 DIAGNOSIS — I10 ESSENTIAL HYPERTENSION: ICD-10-CM

## 2018-04-30 DIAGNOSIS — E78.5 DYSLIPIDEMIA, GOAL LDL BELOW 100: ICD-10-CM

## 2018-04-30 DIAGNOSIS — Z13.29 SCREENING FOR THYROID DISORDER: ICD-10-CM

## 2018-04-30 DIAGNOSIS — M16.0 BILATERAL HIP JOINT ARTHRITIS: ICD-10-CM

## 2018-04-30 DIAGNOSIS — M47.812 ARTHRITIS OF NECK: ICD-10-CM

## 2018-04-30 NOTE — TELEPHONE ENCOUNTER
From: Gaudencio Berry  To: Hannah Garcia M.D.  Sent: 4/30/2018 10:33 AM PDT  Subject: Prescription Question    Hi Dr Garcia,   Sent you an email the other day looking for a refill on my Ibuprofen tablets and still haven't heard from you or [facility] where I have my prescriptions refilled. Please let me know when this can be done.   Thank you  Gaudencio

## 2018-04-30 NOTE — TELEPHONE ENCOUNTER
Can we order labs for this patient to have done ? The refill was refused due to his need for lab work.

## 2018-05-01 ENCOUNTER — PATIENT MESSAGE (OUTPATIENT)
Dept: MEDICAL GROUP | Facility: MEDICAL CENTER | Age: 68
End: 2018-05-01

## 2018-05-01 ENCOUNTER — HOSPITAL ENCOUNTER (OUTPATIENT)
Dept: LAB | Facility: MEDICAL CENTER | Age: 68
End: 2018-05-01
Attending: FAMILY MEDICINE
Payer: MEDICARE

## 2018-05-01 DIAGNOSIS — I10 ESSENTIAL HYPERTENSION: ICD-10-CM

## 2018-05-01 DIAGNOSIS — Z13.0 SCREENING FOR DEFICIENCY ANEMIA: ICD-10-CM

## 2018-05-01 DIAGNOSIS — E78.5 DYSLIPIDEMIA, GOAL LDL BELOW 100: ICD-10-CM

## 2018-05-01 DIAGNOSIS — Z13.29 SCREENING FOR THYROID DISORDER: ICD-10-CM

## 2018-05-01 LAB
ALBUMIN SERPL BCP-MCNC: 4 G/DL (ref 3.2–4.9)
ALBUMIN/GLOB SERPL: 1.5 G/DL
ALP SERPL-CCNC: 51 U/L (ref 30–99)
ALT SERPL-CCNC: 19 U/L (ref 2–50)
ANION GAP SERPL CALC-SCNC: 6 MMOL/L (ref 0–11.9)
AST SERPL-CCNC: 20 U/L (ref 12–45)
BASOPHILS # BLD AUTO: 0.7 % (ref 0–1.8)
BASOPHILS # BLD: 0.04 K/UL (ref 0–0.12)
BILIRUB SERPL-MCNC: 0.6 MG/DL (ref 0.1–1.5)
BUN SERPL-MCNC: 22 MG/DL (ref 8–22)
CALCIUM SERPL-MCNC: 9.2 MG/DL (ref 8.5–10.5)
CHLORIDE SERPL-SCNC: 107 MMOL/L (ref 96–112)
CHOLEST SERPL-MCNC: 174 MG/DL (ref 100–199)
CO2 SERPL-SCNC: 28 MMOL/L (ref 20–33)
CREAT SERPL-MCNC: 0.81 MG/DL (ref 0.5–1.4)
EOSINOPHIL # BLD AUTO: 0.21 K/UL (ref 0–0.51)
EOSINOPHIL NFR BLD: 3.5 % (ref 0–6.9)
ERYTHROCYTE [DISTWIDTH] IN BLOOD BY AUTOMATED COUNT: 42.6 FL (ref 35.9–50)
GLOBULIN SER CALC-MCNC: 2.7 G/DL (ref 1.9–3.5)
GLUCOSE SERPL-MCNC: 94 MG/DL (ref 65–99)
HCT VFR BLD AUTO: 41.2 % (ref 42–52)
HDLC SERPL-MCNC: 35 MG/DL
HGB BLD-MCNC: 14.2 G/DL (ref 14–18)
IMM GRANULOCYTES # BLD AUTO: 0.01 K/UL (ref 0–0.11)
IMM GRANULOCYTES NFR BLD AUTO: 0.2 % (ref 0–0.9)
LDLC SERPL CALC-MCNC: 106 MG/DL
LYMPHOCYTES # BLD AUTO: 1.95 K/UL (ref 1–4.8)
LYMPHOCYTES NFR BLD: 32.3 % (ref 22–41)
MCH RBC QN AUTO: 31.7 PG (ref 27–33)
MCHC RBC AUTO-ENTMCNC: 34.5 G/DL (ref 33.7–35.3)
MCV RBC AUTO: 92 FL (ref 81.4–97.8)
MONOCYTES # BLD AUTO: 0.54 K/UL (ref 0–0.85)
MONOCYTES NFR BLD AUTO: 9 % (ref 0–13.4)
NEUTROPHILS # BLD AUTO: 3.28 K/UL (ref 1.82–7.42)
NEUTROPHILS NFR BLD: 54.3 % (ref 44–72)
NRBC # BLD AUTO: 0 K/UL
NRBC BLD-RTO: 0 /100 WBC
PLATELET # BLD AUTO: 177 K/UL (ref 164–446)
PMV BLD AUTO: 10.5 FL (ref 9–12.9)
POTASSIUM SERPL-SCNC: 5 MMOL/L (ref 3.6–5.5)
PROT SERPL-MCNC: 6.7 G/DL (ref 6–8.2)
RBC # BLD AUTO: 4.48 M/UL (ref 4.7–6.1)
SODIUM SERPL-SCNC: 141 MMOL/L (ref 135–145)
TRIGL SERPL-MCNC: 166 MG/DL (ref 0–149)
TSH SERPL DL<=0.005 MIU/L-ACNC: 1.22 UIU/ML (ref 0.38–5.33)
WBC # BLD AUTO: 6 K/UL (ref 4.8–10.8)

## 2018-05-01 PROCEDURE — 80053 COMPREHEN METABOLIC PANEL: CPT

## 2018-05-01 PROCEDURE — 84443 ASSAY THYROID STIM HORMONE: CPT

## 2018-05-01 PROCEDURE — 36415 COLL VENOUS BLD VENIPUNCTURE: CPT

## 2018-05-01 PROCEDURE — 85025 COMPLETE CBC W/AUTO DIFF WBC: CPT

## 2018-05-01 PROCEDURE — 80061 LIPID PANEL: CPT

## 2018-05-02 NOTE — TELEPHONE ENCOUNTER
From: Gaudencio Berry  To: Hannah Garcia M.D.  Sent: 5/1/2018 11:17 AM PDT  Subject: Non-Urgent Medical Question    Hi Dr. Garcia  Had my blood work done this morning. Waiting for my results  Thank you  Gaudencio

## 2018-05-07 RX ORDER — IBUPROFEN 800 MG/1
800 TABLET ORAL 2 TIMES DAILY
Qty: 180 TAB | Refills: 0 | Status: SHIPPED | OUTPATIENT
Start: 2018-05-07 | End: 2018-10-30 | Stop reason: SDUPTHER

## 2018-05-08 ENCOUNTER — PATIENT MESSAGE (OUTPATIENT)
Dept: MEDICAL GROUP | Facility: MEDICAL CENTER | Age: 68
End: 2018-05-08

## 2018-05-08 NOTE — TELEPHONE ENCOUNTER
Patient just had labs 5/1/18 which showed normal kidney function. I have sent in refill on ibuprofen

## 2018-07-02 DIAGNOSIS — J32.9 SINOBRONCHITIS: ICD-10-CM

## 2018-07-02 DIAGNOSIS — J40 SINOBRONCHITIS: ICD-10-CM

## 2018-07-02 NOTE — TELEPHONE ENCOUNTER
----- Message from Gaudencio Berry sent at 6/29/2018  1:04 PM PDT -----  Regarding: Prescription Question  Contact: [phone]  [facility] Dr. Garcia  Could you please refill my prescription for my inhaler.  [facility] will probably contact you.  Thank you very much  Gaudencio Berry

## 2018-07-05 RX ORDER — ALBUTEROL SULFATE 90 UG/1
AEROSOL, METERED RESPIRATORY (INHALATION)
Qty: 1 INHALER | Refills: 1 | Status: SHIPPED | OUTPATIENT
Start: 2018-07-05 | End: 2018-10-30 | Stop reason: SDUPTHER

## 2018-07-09 ENCOUNTER — OFFICE VISIT (OUTPATIENT)
Dept: URGENT CARE | Facility: PHYSICIAN GROUP | Age: 68
End: 2018-07-09
Payer: MEDICARE

## 2018-07-09 ENCOUNTER — TELEPHONE (OUTPATIENT)
Dept: MEDICAL GROUP | Facility: MEDICAL CENTER | Age: 68
End: 2018-07-09

## 2018-07-09 ENCOUNTER — HOSPITAL ENCOUNTER (OUTPATIENT)
Facility: MEDICAL CENTER | Age: 68
End: 2018-07-09
Attending: EMERGENCY MEDICINE
Payer: MEDICARE

## 2018-07-09 VITALS
TEMPERATURE: 100.9 F | OXYGEN SATURATION: 93 % | WEIGHT: 264.6 LBS | SYSTOLIC BLOOD PRESSURE: 122 MMHG | DIASTOLIC BLOOD PRESSURE: 76 MMHG | RESPIRATION RATE: 16 BRPM | BODY MASS INDEX: 33.78 KG/M2 | HEART RATE: 105 BPM

## 2018-07-09 DIAGNOSIS — N12 PYELONEPHRITIS: ICD-10-CM

## 2018-07-09 LAB
APPEARANCE UR: NORMAL
BILIRUB UR STRIP-MCNC: NORMAL MG/DL
COLOR UR AUTO: NORMAL
GLUCOSE UR STRIP.AUTO-MCNC: NEGATIVE MG/DL
KETONES UR STRIP.AUTO-MCNC: NEGATIVE MG/DL
LEUKOCYTE ESTERASE UR QL STRIP.AUTO: NORMAL
NITRITE UR QL STRIP.AUTO: NEGATIVE
PH UR STRIP.AUTO: 7.5 [PH] (ref 5–8)
PROT UR QL STRIP: 30 MG/DL
RBC UR QL AUTO: NORMAL
SP GR UR STRIP.AUTO: 1.01
UROBILINOGEN UR STRIP-MCNC: 1 MG/DL

## 2018-07-09 PROCEDURE — 81002 URINALYSIS NONAUTO W/O SCOPE: CPT | Performed by: EMERGENCY MEDICINE

## 2018-07-09 PROCEDURE — 87086 URINE CULTURE/COLONY COUNT: CPT

## 2018-07-09 PROCEDURE — 99204 OFFICE O/P NEW MOD 45 MIN: CPT | Performed by: EMERGENCY MEDICINE

## 2018-07-09 RX ORDER — ONDANSETRON 4 MG/1
4 TABLET, ORALLY DISINTEGRATING ORAL EVERY 6 HOURS PRN
Qty: 10 TAB | Refills: 0 | Status: SHIPPED
Start: 2018-07-09 | End: 2020-01-29

## 2018-07-09 RX ORDER — ACETAMINOPHEN 500 MG
500-1000 TABLET ORAL EVERY 6 HOURS PRN
COMMUNITY
End: 2018-10-30

## 2018-07-09 RX ORDER — CIPROFLOXACIN 500 MG/1
500 TABLET, FILM COATED ORAL 2 TIMES DAILY
Qty: 28 TAB | Refills: 0 | Status: SHIPPED | OUTPATIENT
Start: 2018-07-09 | End: 2018-10-30

## 2018-07-09 RX ORDER — M-VIT,TX,IRON,MINS/CALC/FOLIC 27MG-0.4MG
1 TABLET ORAL DAILY
COMMUNITY
End: 2020-01-29

## 2018-07-09 ASSESSMENT — ENCOUNTER SYMPTOMS
PALPITATIONS: 0
SPEECH CHANGE: 0
FEVER: 1
MYALGIAS: 1
SENSORY CHANGE: 0
EYE DISCHARGE: 0
EYE REDNESS: 0
VOMITING: 1
NAUSEA: 1
CHILLS: 0
FLANK PAIN: 0
NERVOUS/ANXIOUS: 0

## 2018-07-09 ASSESSMENT — PAIN SCALES - GENERAL: PAINLEVEL: NO PAIN

## 2018-07-09 NOTE — PROGRESS NOTES
"Subjective:      Gaudencio Berry is a 68 y.o. male who presents with UTI (possible uti, vomited yesterday, chills)            HPI  Pt ill for the past two days with nausea and vomiting with associated UTI symptoms. Patient states has had \"kidney infections\" in the past although not for 15 years.  Patient denies any evaluation by a urologist throughout this period of time.    Patient had nausea and vomited ×1 no blood in his vomitus. Denies any shortness of breath chest pressure.    PMH:  has a past medical history of Allergy; Arthritis; Cataract; Hyperlipidemia; and Hypertension.  MEDS:   Current Outpatient Prescriptions:   •  acetaminophen (TYLENOL) 500 MG Tab, Take 500-1,000 mg by mouth every 6 hours as needed., Disp: , Rfl:   •  therapeutic multivitamin-minerals (THERAGRAN-M) Tab, Take 1 Tab by mouth every day., Disp: , Rfl:   •  ondansetron (ZOFRAN ODT) 4 MG TABLET DISPERSIBLE, Take 1 Tab by mouth every 6 hours as needed for Nausea., Disp: 10 Tab, Rfl: 0  •  ciprofloxacin (CIPRO) 500 MG Tab, Take 1 Tab by mouth 2 times a day., Disp: 28 Tab, Rfl: 0  •  albuterol (PROAIR HFA) 108 (90 Base) MCG/ACT Aero Soln inhalation aerosol, INHALE TWO PUFFS BY MOUTH EVERY 6 HOURS AS NEEDED FOR SHORTNESS OF BREATH, Disp: 1 Inhaler, Rfl: 1  •  lisinopril-hydrochlorothiazide (PRINZIDE, ZESTORETIC) 10-12.5 MG per tablet, Take 1 Tab by mouth every day., Disp: 90 Tab, Rfl: 3  •  Cetirizine HCl (KLS ALLER-ALIA PO), Take  by mouth., Disp: , Rfl:   •  ibuprofen (MOTRIN) 800 MG Tab, Take 1 Tab by mouth 2 Times a Day., Disp: 180 Tab, Rfl: 0  •  amoxicillin-clavulanate (AUGMENTIN) 875-125 MG Tab, Take 1 Tab by mouth 2 times a day., Disp: 20 Tab, Rfl: 0  •  Cholecalciferol (VITAMIN D) 2000 UNITS Cap, Take  by mouth every day., Disp: , Rfl:     Current Facility-Administered Medications:   •  cefTRIAXone (ROCEPHIN) 1 g, lidocaine (XYLOCAINE) 1 % 3.6 mL for IM use, 1 g, Intramuscular, Once, Gideon Starr M.D.  ALLERGIES:   Allergies   Allergen " Reactions   • Codeine Rash and Itching     Per patient, itch all over body, rash   • Shellfish Allergy Diarrhea and Vomiting     Per patient, GI issues, vomiting, diarrhea     SURGHX:   Past Surgical History:   Procedure Laterality Date   • CATARACT PHACO WITH IOL     • EYE SURGERY      bilateral cataract   • INGUINAL HERNIA REPAIR Right     35 years ago     SOCHX:  reports that he quit smoking about 36 years ago. His smoking use included Cigarettes. He has a 7.50 pack-year smoking history. He has never used smokeless tobacco. He reports that he drinks about 1.2 oz of alcohol per week . He reports that he does not use drugs.  FH: family history includes Heart Disease in his mother.  Review of Systems   Constitutional: Positive for fever and malaise/fatigue. Negative for chills.   Eyes: Negative for discharge and redness.   Cardiovascular: Negative for chest pain and palpitations.   Gastrointestinal: Positive for nausea and vomiting.   Genitourinary: Positive for dysuria, frequency and urgency. Negative for flank pain.   Musculoskeletal: Positive for myalgias.   Skin: Negative for rash.   Neurological: Negative for sensory change and speech change.   Psychiatric/Behavioral: The patient is not nervous/anxious.           Objective:     /76   Pulse (!) 105   Temp (!) 38.3 °C (100.9 °F)   Resp 16   Wt 120 kg (264 lb 9.6 oz)   SpO2 93%   BMI 33.78 kg/m²      Physical Exam   Constitutional: He appears well-developed and well-nourished. He appears distressed.   Fever 100.9.   HENT:   Head: Normocephalic and atraumatic.   Right Ear: External ear normal.   Left Ear: External ear normal.   Eyes: Right eye exhibits no discharge.   Neck: Normal range of motion.   Cardiovascular: Regular rhythm.    Heart rate 108.   Pulmonary/Chest: Effort normal and breath sounds normal. No respiratory distress.   Abdominal: He exhibits no distension.   Genitourinary: Rectum normal and prostate normal. Rectal exam shows guaiac  negative stool.   Genitourinary Comments: Patient had no stool on digital exam to guaiac. Prostate nontender. Nonenlarged   Skin: Skin is dry. He is not diaphoretic. No erythema.   Psychiatric: He has a normal mood and affect.   Nursing note and vitals reviewed.           POCT UA Moderate leukocytes , trace blood, trace blaine    Urine culture pending   Assessment/Plan:     Diagnosis acute pyelonephritis    Patient will be referred to urology given him an injection of Rocephin 1 g as well as started him a prescription for Cipro 500 twice a day for 2 weeks. I will call him with the culture results. He has Zofran should develop nausea or vomiting. If symptoms worsen I recommended. Emergency room for complete evaluation otherwise I feel he can follow up with urologist.

## 2018-07-09 NOTE — TELEPHONE ENCOUNTER
1. Caller Name: Catarina Byrd pt's wife                  Call Back Number: [phone]      Patient approves a detailed voicemail message: yes    2. What are the patient's symptoms (location & severity)? Fever x 36 hours with chills, vomiting twice yesterday. Experiencing extreme weakness.     3. Is this a new symptom Yes    4. When did it start? Saturday afternoon

## 2018-07-12 LAB
BACTERIA UR CULT: NORMAL
SIGNIFICANT IND 70042: NORMAL
SITE SITE: NORMAL
SOURCE SOURCE: NORMAL

## 2018-09-26 ENCOUNTER — PATIENT MESSAGE (OUTPATIENT)
Dept: MEDICAL GROUP | Facility: MEDICAL CENTER | Age: 68
End: 2018-09-26

## 2018-09-27 ENCOUNTER — IMMUNIZATION (OUTPATIENT)
Dept: SOCIAL WORK | Facility: CLINIC | Age: 68
End: 2018-09-27
Payer: MEDICARE

## 2018-09-27 DIAGNOSIS — Z23 NEED FOR VACCINATION: ICD-10-CM

## 2018-09-27 PROCEDURE — 90662 IIV NO PRSV INCREASED AG IM: CPT | Performed by: REGISTERED NURSE

## 2018-09-27 PROCEDURE — G0008 ADMIN INFLUENZA VIRUS VAC: HCPCS | Performed by: REGISTERED NURSE

## 2018-09-27 NOTE — TELEPHONE ENCOUNTER
From: Gaudencio Berry  To: Hannah Garcia M.D.  Sent: 9/26/2018 8:10 PM PDT  Subject: Prescription Question    Hi   Could you please send in a refill to [facility] [facility] for my Lisinopril/HCL  90 day prescription. They need your approval to proceed. Thanks for your help.  Gaudencio Berry

## 2018-10-30 ENCOUNTER — OFFICE VISIT (OUTPATIENT)
Dept: MEDICAL GROUP | Facility: LAB | Age: 68
End: 2018-10-30
Payer: MEDICARE

## 2018-10-30 VITALS
WEIGHT: 270 LBS | BODY MASS INDEX: 34.65 KG/M2 | HEIGHT: 74 IN | HEART RATE: 75 BPM | SYSTOLIC BLOOD PRESSURE: 124 MMHG | DIASTOLIC BLOOD PRESSURE: 66 MMHG | TEMPERATURE: 98.2 F | OXYGEN SATURATION: 95 % | RESPIRATION RATE: 20 BRPM

## 2018-10-30 DIAGNOSIS — E66.9 OBESITY (BMI 30-39.9): ICD-10-CM

## 2018-10-30 DIAGNOSIS — M47.812 ARTHRITIS OF NECK: ICD-10-CM

## 2018-10-30 DIAGNOSIS — E78.5 DYSLIPIDEMIA, GOAL LDL BELOW 100: ICD-10-CM

## 2018-10-30 DIAGNOSIS — I10 ESSENTIAL HYPERTENSION: ICD-10-CM

## 2018-10-30 DIAGNOSIS — J30.1 ALLERGIC RHINITIS DUE TO POLLEN, UNSPECIFIED SEASONALITY: ICD-10-CM

## 2018-10-30 DIAGNOSIS — M16.0 BILATERAL HIP JOINT ARTHRITIS: ICD-10-CM

## 2018-10-30 DIAGNOSIS — Z00.00 MEDICARE ANNUAL WELLNESS VISIT, SUBSEQUENT: ICD-10-CM

## 2018-10-30 PROBLEM — S90.931A: Status: RESOLVED | Noted: 2017-07-13 | Resolved: 2018-10-30

## 2018-10-30 PROBLEM — L81.9 CHANGING PIGMENTED SKIN LESION: Status: RESOLVED | Noted: 2017-02-24 | Resolved: 2018-10-30

## 2018-10-30 PROBLEM — J32.9 SINOBRONCHITIS: Status: RESOLVED | Noted: 2017-11-30 | Resolved: 2018-10-30

## 2018-10-30 PROBLEM — J40 BRONCHITIS: Status: RESOLVED | Noted: 2017-03-16 | Resolved: 2018-10-30

## 2018-10-30 PROBLEM — J40 SINOBRONCHITIS: Status: RESOLVED | Noted: 2017-11-30 | Resolved: 2018-10-30

## 2018-10-30 PROCEDURE — 99214 OFFICE O/P EST MOD 30 MIN: CPT | Performed by: FAMILY MEDICINE

## 2018-10-30 RX ORDER — IBUPROFEN 800 MG/1
800 TABLET ORAL 2 TIMES DAILY
Qty: 180 TAB | Refills: 1 | Status: SHIPPED | OUTPATIENT
Start: 2018-10-30 | End: 2019-09-10 | Stop reason: SDUPTHER

## 2018-10-30 RX ORDER — ALBUTEROL SULFATE 90 UG/1
AEROSOL, METERED RESPIRATORY (INHALATION)
Qty: 1 INHALER | Refills: 1 | Status: SHIPPED | OUTPATIENT
Start: 2018-10-30 | End: 2019-07-24 | Stop reason: SDUPTHER

## 2018-10-30 RX ORDER — LISINOPRIL AND HYDROCHLOROTHIAZIDE 12.5; 1 MG/1; MG/1
TABLET ORAL
Qty: 90 TAB | Refills: 3 | Status: SHIPPED | OUTPATIENT
Start: 2018-10-30 | End: 2019-09-10 | Stop reason: SDUPTHER

## 2018-10-30 ASSESSMENT — PATIENT HEALTH QUESTIONNAIRE - PHQ9: CLINICAL INTERPRETATION OF PHQ2 SCORE: 0

## 2018-10-30 ASSESSMENT — ENCOUNTER SYMPTOMS: GENERAL WELL-BEING: GOOD

## 2018-10-30 ASSESSMENT — ACTIVITIES OF DAILY LIVING (ADL): BATHING_REQUIRES_ASSISTANCE: 0

## 2018-10-31 NOTE — PROGRESS NOTES
Chief Complaint   Patient presents with   • Annual Exam     medication refill : inhaler,          HPI:  Gaudencio Berry is a 68 y.o. here for Medicare Annual Wellness Visit     Patient Active Problem List    Diagnosis Date Noted   • Dysplastic nevus of right upper extremity 03/16/2017   • Dyslipidemia, goal LDL below 100 07/20/2016   • Obesity (BMI 30-39.9) 07/20/2016   • Bilateral hip joint arthritis 06/28/2016   • Essential hypertension 10/22/2015   • Arthritis of neck 10/22/2015   • Allergic rhinitis due to pollen 10/22/2015       Current Outpatient Prescriptions   Medication Sig Dispense Refill   • ibuprofen (MOTRIN) 800 MG Tab Take 1 Tab by mouth 2 Times a Day. 180 Tab 1   • lisinopril-hydrochlorothiazide (PRINZIDE, ZESTORETIC) 10-12.5 MG per tablet TAKE ONE TABLET BY MOUTH ONCE DAILY 90 Tab 3   • albuterol (PROAIR HFA) 108 (90 Base) MCG/ACT Aero Soln inhalation aerosol INHALE TWO PUFFS BY MOUTH EVERY 6 HOURS AS NEEDED FOR SHORTNESS OF BREATH 1 Inhaler 1   • therapeutic multivitamin-minerals (THERAGRAN-M) Tab Take 1 Tab by mouth every day.     • Cholecalciferol (VITAMIN D) 2000 UNITS Cap Take  by mouth every day.     • ondansetron (ZOFRAN ODT) 4 MG TABLET DISPERSIBLE Take 1 Tab by mouth every 6 hours as needed for Nausea. 10 Tab 0   • Cetirizine HCl (KLS ALLER-ALIA PO) Take  by mouth.       No current facility-administered medications for this visit.             Current supplements as per medication list.       Allergies: Codeine and Shellfish allergy    Current social contact/activities:      He  reports that he quit smoking about 36 years ago. His smoking use included Cigarettes. He has a 7.50 pack-year smoking history. He has never used smokeless tobacco. He reports that he drinks about 1.2 oz of alcohol per week . He reports that he does not use drugs.  Counseling given: Not Answered      DPA/Advanced Directive:  Patient does not have an Advanced Directive.  A packet and workshop information was given on  Advanced Directives.    ROS:    Gait: Uses no assistive device  Ostomy: No  Other tubes: No  Amputations: No  Chronic oxygen use: No  Last eye exam: Last 6 months  Wears hearing aids: No   : Denies any urinary leakage during the last 6 months    Screening:    Depression Screening    Little interest or pleasure in doing things?  0 - not at all  Feeling down, depressed , or hopeless? 0 - not at all  Patient Health Questionnaire Score: 0     If depressive symptoms identified deferred to follow up visit unless specifically addressed in assessment and plan.    Interpretation of PHQ-9 Total Score   Score Severity   1-4 No Depression   5-9 Mild Depression   10-14 Moderate Depression   15-19 Moderately Severe Depression   20-27 Severe Depression    Screening for Cognitive Impairment    Three Minute Recall (leader, season, table) 3/3    Nile clock face with all 12 numbers and set the hands to show 10 past 11.  Yes    Cognitive concerns identified deferred for follow up unless specifically addressed in assessment and plan.    Fall Risk Assessment    Has the patient had two or more falls in the last year or any fall with injury in the last year?  No    Safety Assessment    Throw rugs on floor.  Yes  Handrails on all stairs.  Yes  Good lighting in all hallways.  Yes  Difficulty hearing.  Yes  Patient counseled about all safety risks that were identified.    Functional Assessment ADLs    Are there any barriers preventing you from cooking for yourself or meeting nutritional needs?  No.    Are there any barriers preventing you from driving safely or obtaining transportation?  No.    Are there any barriers preventing you from using a telephone or calling for help?  No.    Are there any barriers preventing you from shopping?  No.    Are there any barriers preventing you from taking care of your own finances?  No.    Are there any barriers preventing you from managing your medications?  No.    Are there any barriers preventing you  "from showering, bathing or dressing yourself?  No.    Are you currently engaging in any exercise or physical activity?  Yes.     What is your perception of your health?  Good.      Health Maintenance Summary                COLONOSCOPY Overdue 1999     IMM ZOSTER VACCINES Overdue 2015      Done 2015 Imm Admin: Zoster Vaccine Live (ZVL) (Zostavax)    IMM INFLUENZA Overdue 2018      Done 10/22/2015 Imm Admin: Influenza Vaccine Adult HD     Patient has more history with this topic...    IMM DTaP/Tdap/Td Vaccine Next Due 2020      Done 2010 Imm Admin: Tdap Vaccine          Patient Care Team:  Hannah Garcia M.D. as PCP - General (Family Medicine)        Social History   Substance Use Topics   • Smoking status: Former Smoker     Packs/day: 0.50     Years: 15.00     Types: Cigarettes     Quit date: 1982   • Smokeless tobacco: Never Used   • Alcohol use 1.2 oz/week     2 Shots of liquor per week      Comment: occasionally     Family History   Problem Relation Age of Onset   • Heart Disease Mother          at 64     He  has a past medical history of Allergy; Arthritis; Cataract; Hyperlipidemia; and Hypertension.   Past Surgical History:   Procedure Laterality Date   • CATARACT PHACO WITH IOL     • EYE SURGERY      bilateral cataract   • INGUINAL HERNIA REPAIR Right     35 years ago       Exam:   Blood pressure 124/66, pulse 75, temperature 36.8 °C (98.2 °F), temperature source Temporal, resp. rate 20, height 1.885 m (6' 2.2\"), weight 122.5 kg (270 lb), SpO2 95 %. Body mass index is 34.48 kg/m².    Hearing fair.    Dentition fair  Alert, oriented in no acute distress.  Eye contact is good, speech goal directed, affect calm  Constitutional: Alert, no distress.  Skin: Warm, dry, good turgor, no rashes in visible areas.  Eye: Equal, round and reactive, conjunctiva clear, lids normal.  ENMT: Lips without lesions, good dentition, oropharynx clear.  Neck: Trachea midline, no masses, no " thyromegaly. No cervical or supraclavicular lymphadenopathy  Respiratory: Unlabored respiratory effort, lungs clear to auscultation, no wheezes, no ronchi.  Cardiovascular: Normal S1, S2, RRR, no murmur, no edema.  Abdomen: Soft, non-tender, no masses, no hepatosplenomegaly.  Psych: Alert and oriented x3, normal affect and mood.      Assessment and Plan. The following treatment and monitoring plan is recommended:    1. Medicare annual wellness visit, subsequent  Routine anticipatory guidance.  No special services needed at this time    2. Essential hypertension  This is a chronic medical condition that is currently stable  Continue lisinopril hydrochlorothiazide  - lisinopril-hydrochlorothiazide (PRINZIDE, ZESTORETIC) 10-12.5 MG per tablet; TAKE ONE TABLET BY MOUTH ONCE DAILY  Dispense: 90 Tab; Refill: 3    3. Arthritis of neck  This is a chronic medical condition that is currently stable  Continue ibuprofen.  Patient is currently using once a day on average.  Normal kidney function  - ibuprofen (MOTRIN) 800 MG Tab; Take 1 Tab by mouth 2 Times a Day.  Dispense: 180 Tab; Refill: 1    4. Bilateral hip joint arthritis  This is a chronic medical condition that is currently stable  See #3  - ibuprofen (MOTRIN) 800 MG Tab; Take 1 Tab by mouth 2 Times a Day.  Dispense: 180 Tab; Refill: 1    5. Allergic rhinitis due to pollen, unspecified seasonality  Refill albuterol for occasional use  - albuterol (PROAIR HFA) 108 (90 Base) MCG/ACT Aero Soln inhalation aerosol; INHALE TWO PUFFS BY MOUTH EVERY 6 HOURS AS NEEDED FOR SHORTNESS OF BREATH  Dispense: 1 Inhaler; Refill: 1    6. Dyslipidemia, goal LDL below 100  Dietary counseling done.  Continue to monitor    7. Obesity (BMI 30-39.9)  Currently walking 5 miles a day.  Encourage weight loss for overall health.  Dietary counseling done.          Services suggested: No services needed at this time  Health Care Screening: Age-appropriate preventive services recommended by USPTF and  ACIP covered by Medicare were discussed today. Services ordered if indicated and agreed upon by the patient.  Referrals offered: Community-based lifestyle interventions to reduce health risks and promote self-management and wellness, fall prevention, nutrition, physical activity, tobacco-use cessation, weight loss, and mental health services as per orders if indicated.    Discussion today about general wellness and lifestyle habits:    · Prevent falls and reduce trip hazards; Cautioned about securing or removing rugs.  · Have a working fire alarm and carbon monoxide detector;   · Engage in regular physical activity and social activities     Follow-up: Return in about 1 year (around 10/30/2019).

## 2018-10-31 NOTE — ASSESSMENT & PLAN NOTE
Stable. Currently taking lisinopril- hydrochlorothiazide 10-12 0.5 as directed.   He is not taking baby aspirin daily.   He is not monitoring BP at home.   Denies symptoms low BP: light-headed, tunnel-vision, unusual fatigue.   Denies symptoms high BP:pounding headache, visual changes, palpitations, flushed face.   Denies medicine side effects: unusual fatigue, slow heartbeat, foot/leg swelling, cough.

## 2019-03-20 ENCOUNTER — OFFICE VISIT (OUTPATIENT)
Dept: URGENT CARE | Facility: CLINIC | Age: 69
End: 2019-03-20
Payer: MEDICARE

## 2019-03-20 VITALS
RESPIRATION RATE: 16 BRPM | HEIGHT: 74 IN | SYSTOLIC BLOOD PRESSURE: 126 MMHG | TEMPERATURE: 98.8 F | OXYGEN SATURATION: 95 % | WEIGHT: 271 LBS | HEART RATE: 80 BPM | BODY MASS INDEX: 34.78 KG/M2 | DIASTOLIC BLOOD PRESSURE: 84 MMHG

## 2019-03-20 DIAGNOSIS — M62.830 LUMBAR PARASPINAL MUSCLE SPASM: ICD-10-CM

## 2019-03-20 PROCEDURE — 99214 OFFICE O/P EST MOD 30 MIN: CPT | Mod: 25 | Performed by: PHYSICIAN ASSISTANT

## 2019-03-20 RX ORDER — CYCLOBENZAPRINE HCL 5 MG
5-10 TABLET ORAL 3 TIMES DAILY PRN
Qty: 30 TAB | Refills: 0 | Status: SHIPPED
Start: 2019-03-20 | End: 2020-01-29

## 2019-03-20 RX ORDER — KETOROLAC TROMETHAMINE 30 MG/ML
30 INJECTION, SOLUTION INTRAMUSCULAR; INTRAVENOUS ONCE
Status: COMPLETED | OUTPATIENT
Start: 2019-03-20 | End: 2019-03-20

## 2019-03-20 RX ORDER — IBUPROFEN 200 MG
200 TABLET ORAL EVERY 6 HOURS PRN
COMMUNITY
End: 2020-01-29

## 2019-03-20 RX ORDER — METHYLPREDNISOLONE 4 MG/1
TABLET ORAL
Qty: 1 KIT | Refills: 0 | Status: SHIPPED
Start: 2019-03-20 | End: 2020-01-29

## 2019-03-20 RX ADMIN — KETOROLAC TROMETHAMINE 30 MG: 30 INJECTION, SOLUTION INTRAMUSCULAR; INTRAVENOUS at 18:11

## 2019-03-20 ASSESSMENT — ENCOUNTER SYMPTOMS
FEVER: 0
TINGLING: 0
VOMITING: 0
FOCAL WEAKNESS: 0
SENSORY CHANGE: 0
NAUSEA: 0
SHORTNESS OF BREATH: 0
BACK PAIN: 1
FLANK PAIN: 0
CHILLS: 0

## 2019-03-21 NOTE — PROGRESS NOTES
"Subjective:   Gaudencio Berry is a 68 y.o. male who presents for Back Pain (lower (L) side of back x 2 days)        Back Pain   This is a new problem. The current episode started yesterday. Pertinent negatives include no dysuria, fever or tingling.   Patient his wife have been moving from their home over the last 2-3 days.  Patient has had to climb on roof as well as crawl under the house and has had recurrence of spasm in the left low back.  He notes past medical history of similar with flareups every few years.  He notes typically has a few days of pain and is able to resume normal activity thereafter. Denies saddle anesthesia, incontinence of urine or bowel, retention of urine or bowel, also denies numbness/tingling or weakness to UE/LE.  He denies past medical history of surgery to low back.  Denies numbness tingling or weakness to lower extremities.  Has tried over-the-counter anti-inflammatories is mild but persistent pain.     Review of Systems   Constitutional: Negative for chills and fever.   Respiratory: Negative for shortness of breath.    Gastrointestinal: Negative for nausea and vomiting.   Genitourinary: Negative for dysuria, flank pain, frequency, hematuria and urgency.   Musculoskeletal: Positive for back pain.   Skin: Negative for rash.   Neurological: Negative for tingling, sensory change and focal weakness.     Allergies   Allergen Reactions   • Codeine Rash and Itching     Per patient, itch all over body, rash   • Shellfish Allergy Diarrhea and Vomiting     Per patient, GI issues, vomiting, diarrhea      Objective:   /84 (BP Location: Right arm, Patient Position: Sitting, BP Cuff Size: Large adult)   Pulse 80   Temp 37.1 °C (98.8 °F) (Temporal)   Resp 16   Ht 1.88 m (6' 2\")   Wt 122.9 kg (271 lb)   SpO2 95%   BMI 34.79 kg/m²   Physical Exam   Constitutional: He is oriented to person, place, and time. He appears well-developed and well-nourished. No distress.   HENT:   Head: Normocephalic " and atraumatic.   Right Ear: External ear normal.   Left Ear: External ear normal.   Nose: Nose normal.   Eyes: Conjunctivae are normal. Right eye exhibits no discharge. Left eye exhibits no discharge. No scleral icterus.   Neck: Neck supple.   Pulmonary/Chest: Effort normal. No respiratory distress.   Musculoskeletal:        Lumbar back: He exhibits decreased range of motion ( 2/2 pain), tenderness ( primary paraspinous), pain and spasm. He exhibits no bony tenderness, no swelling, no edema, no deformity, no laceration and normal pulse.        Back:    Neurological: He is alert and oriented to person, place, and time. He has normal strength and normal reflexes. He is not disoriented. No sensory deficit. He displays a negative Romberg sign. Coordination normal.   SLR normal bilat   Skin: Skin is warm and dry. He is not diaphoretic. No pallor.   Psychiatric: He has a normal mood and affect.   Nursing note and vitals reviewed.  Toradol 30 IM-tolerates well      Assessment/Plan:   1. Lumbar paraspinal muscle spasm  - ketorolac (TORADOL) injection 30 mg; 1 mL by Intramuscular route Once.  - MethylPREDNISolone (MEDROL DOSEPAK) 4 MG Tablet Therapy Pack; Take as directed on package.  Dispense one package.  Dispense: 1 Kit; Refill: 0  - cyclobenzaprine (FLEXERIL) 5 MG tablet; Take 1-2 Tabs by mouth 3 times a day as needed.  Dispense: 30 Tab; Refill: 0    Other orders  - ibuprofen (MOTRIN) 200 MG Tab; Take 200 mg by mouth every 6 hours as needed.  Recommend conservative care, rest, ice/heat, work on gentle ROM exercises  Return to clinic with lack of resolution or progression of symptoms.  Cautioned regarding potential for sedation with medication.  Sent w/ stretches  Differential diagnosis, natural history, supportive care, and indications for immediate follow-up discussed.

## 2019-07-24 DIAGNOSIS — J30.1 ALLERGIC RHINITIS DUE TO POLLEN, UNSPECIFIED SEASONALITY: ICD-10-CM

## 2019-07-24 RX ORDER — ALBUTEROL SULFATE 90 UG/1
AEROSOL, METERED RESPIRATORY (INHALATION)
Qty: 1 INHALER | Refills: 1 | Status: SHIPPED
Start: 2019-07-24 | End: 2020-01-29 | Stop reason: SDUPTHER

## 2019-07-24 NOTE — TELEPHONE ENCOUNTER
----- Message from Gaudencio Berry sent at 7/24/2019 10:10 AM PDT -----  Regarding: Prescription Question  Contact: [phone]  Hi Dr. Garcia  We are still traveling and we are in [facility].  Could you please refill my prescription for my inhaler and sen it to [id number], [address] [place] [address].  If you can do this today as we are leaving tomorrow morning.  Thank you  Gaudencio Berry

## 2019-09-10 ENCOUNTER — TELEPHONE (OUTPATIENT)
Dept: MEDICAL GROUP | Facility: LAB | Age: 69
End: 2019-09-10

## 2019-09-10 DIAGNOSIS — M47.812 ARTHRITIS OF NECK: ICD-10-CM

## 2019-09-10 DIAGNOSIS — M16.0 BILATERAL HIP JOINT ARTHRITIS: ICD-10-CM

## 2019-09-10 DIAGNOSIS — I10 ESSENTIAL HYPERTENSION: ICD-10-CM

## 2019-09-10 RX ORDER — LISINOPRIL AND HYDROCHLOROTHIAZIDE 12.5; 1 MG/1; MG/1
TABLET ORAL
Qty: 90 TAB | Refills: 0 | Status: SHIPPED | OUTPATIENT
Start: 2019-09-10 | End: 2020-01-29 | Stop reason: SDUPTHER

## 2019-09-10 RX ORDER — IBUPROFEN 800 MG/1
TABLET ORAL
Qty: 180 TAB | Refills: 0 | Status: SHIPPED
Start: 2019-09-10 | End: 2020-01-29

## 2019-09-10 NOTE — TELEPHONE ENCOUNTER
Was the patient seen in the last year in this department? Yes lov 10/30/2018    Does patient have an active prescription for medications requested? No     Received Request Via: Pharmacy

## 2020-01-29 ENCOUNTER — OFFICE VISIT (OUTPATIENT)
Dept: MEDICAL GROUP | Facility: LAB | Age: 70
End: 2020-01-29
Payer: MEDICARE

## 2020-01-29 ENCOUNTER — HOSPITAL ENCOUNTER (OUTPATIENT)
Dept: LAB | Facility: MEDICAL CENTER | Age: 70
End: 2020-01-29
Attending: FAMILY MEDICINE
Payer: MEDICARE

## 2020-01-29 VITALS
RESPIRATION RATE: 16 BRPM | SYSTOLIC BLOOD PRESSURE: 130 MMHG | HEART RATE: 94 BPM | OXYGEN SATURATION: 95 % | WEIGHT: 273 LBS | HEIGHT: 74 IN | DIASTOLIC BLOOD PRESSURE: 80 MMHG | BODY MASS INDEX: 35.04 KG/M2 | TEMPERATURE: 98.2 F

## 2020-01-29 DIAGNOSIS — J32.9 SINOBRONCHITIS: ICD-10-CM

## 2020-01-29 DIAGNOSIS — I10 ESSENTIAL HYPERTENSION: ICD-10-CM

## 2020-01-29 DIAGNOSIS — M16.0 BILATERAL HIP JOINT ARTHRITIS: ICD-10-CM

## 2020-01-29 DIAGNOSIS — E78.5 DYSLIPIDEMIA, GOAL LDL BELOW 100: ICD-10-CM

## 2020-01-29 DIAGNOSIS — M47.812 ARTHRITIS OF NECK: ICD-10-CM

## 2020-01-29 DIAGNOSIS — J30.1 ALLERGIC RHINITIS DUE TO POLLEN, UNSPECIFIED SEASONALITY: ICD-10-CM

## 2020-01-29 DIAGNOSIS — J40 SINOBRONCHITIS: ICD-10-CM

## 2020-01-29 LAB
ALBUMIN SERPL BCP-MCNC: 4.1 G/DL (ref 3.2–4.9)
ALBUMIN/GLOB SERPL: 1.6 G/DL
ALP SERPL-CCNC: 67 U/L (ref 30–99)
ALT SERPL-CCNC: 19 U/L (ref 2–50)
ANION GAP SERPL CALC-SCNC: 7 MMOL/L (ref 0–11.9)
AST SERPL-CCNC: 19 U/L (ref 12–45)
BILIRUB SERPL-MCNC: 0.4 MG/DL (ref 0.1–1.5)
BUN SERPL-MCNC: 18 MG/DL (ref 8–22)
CALCIUM SERPL-MCNC: 9.6 MG/DL (ref 8.5–10.5)
CHLORIDE SERPL-SCNC: 106 MMOL/L (ref 96–112)
CHOLEST SERPL-MCNC: 171 MG/DL (ref 100–199)
CO2 SERPL-SCNC: 28 MMOL/L (ref 20–33)
CREAT SERPL-MCNC: 0.97 MG/DL (ref 0.5–1.4)
FASTING STATUS PATIENT QL REPORTED: NORMAL
GLOBULIN SER CALC-MCNC: 2.6 G/DL (ref 1.9–3.5)
GLUCOSE SERPL-MCNC: 117 MG/DL (ref 65–99)
HDLC SERPL-MCNC: 32 MG/DL
LDLC SERPL CALC-MCNC: ABNORMAL MG/DL
POTASSIUM SERPL-SCNC: 4 MMOL/L (ref 3.6–5.5)
PROT SERPL-MCNC: 6.7 G/DL (ref 6–8.2)
SODIUM SERPL-SCNC: 141 MMOL/L (ref 135–145)
TRIGL SERPL-MCNC: 479 MG/DL (ref 0–149)

## 2020-01-29 PROCEDURE — 99214 OFFICE O/P EST MOD 30 MIN: CPT | Performed by: FAMILY MEDICINE

## 2020-01-29 PROCEDURE — 84443 ASSAY THYROID STIM HORMONE: CPT

## 2020-01-29 PROCEDURE — 36415 COLL VENOUS BLD VENIPUNCTURE: CPT

## 2020-01-29 PROCEDURE — 80061 LIPID PANEL: CPT

## 2020-01-29 PROCEDURE — 80053 COMPREHEN METABOLIC PANEL: CPT

## 2020-01-29 RX ORDER — LISINOPRIL AND HYDROCHLOROTHIAZIDE 12.5; 1 MG/1; MG/1
TABLET ORAL
Qty: 90 TAB | Refills: 3 | Status: SHIPPED | OUTPATIENT
Start: 2020-01-29 | End: 2020-06-30 | Stop reason: SDUPTHER

## 2020-01-29 RX ORDER — AZITHROMYCIN 250 MG/1
TABLET, FILM COATED ORAL
Qty: 6 TAB | Refills: 0 | Status: SHIPPED | OUTPATIENT
Start: 2020-01-29 | End: 2020-02-03

## 2020-01-29 RX ORDER — ALBUTEROL SULFATE 90 UG/1
AEROSOL, METERED RESPIRATORY (INHALATION)
Qty: 1 INHALER | Refills: 3 | Status: SHIPPED | OUTPATIENT
Start: 2020-01-29

## 2020-01-29 RX ORDER — IBUPROFEN 800 MG/1
TABLET ORAL
Qty: 180 TAB | Refills: 0 | Status: SHIPPED | OUTPATIENT
Start: 2020-01-29 | End: 2020-08-14 | Stop reason: SDUPTHER

## 2020-01-29 NOTE — ASSESSMENT & PLAN NOTE
Illness: 6 weeks of illness including: nasal congestion, green/purulent rhinorrhea, cough ,  Sinus pain and pressure: bilateral maxillary.  Had fever and chills initially  Symptoms negative for dyspnea, wheezing,   Treatments tried: treated with Amoxicillin 5 weeks ago which helped initially   Since onset, symptoms are worse   Similarly ill exposures: yes  Medical history negative for asthma  He  reports that he quit smoking about 38 years ago. His smoking use included cigarettes. He has a 7.50 pack-year smoking history. He has never used smokeless tobacco.

## 2020-01-29 NOTE — PATIENT INSTRUCTIONS

## 2020-01-30 LAB — TSH SERPL DL<=0.005 MIU/L-ACNC: 1.24 UIU/ML (ref 0.38–5.33)

## 2020-01-30 NOTE — PROGRESS NOTES
Subjective:     Chief Complaint   Patient presents with   • URI     x6 weeks       Gaudencio Berry is a 69 y.o. male here today for evaluation and management of:    Sinobronchitis  Illness: 6 weeks of illness including: nasal congestion, green/purulent rhinorrhea, cough ,  Sinus pain and pressure: bilateral maxillary.  Had fever and chills initially  Symptoms negative for dyspnea, wheezing,   Treatments tried: treated with Amoxicillin 5 weeks ago which helped initially   Since onset, symptoms are worse   Similarly ill exposures: yes  Medical history negative for asthma  He  reports that he quit smoking about 38 years ago. His smoking use included cigarettes. He has a 7.50 pack-year smoking history. He has never used smokeless tobacco.      Dyslipidemia, goal LDL below 100  Patient is not currently on medications.  He refuses statins at this time.    Essential hypertension  Stable. Currently taking lisinopril-hctz 10-12.5 MG DAILY as directed.   He is not taking baby aspirin daily.   He is monitoring BP at home.   Denies symptoms low BP: light-headed, tunnel-vision, unusual fatigue.   Denies symptoms high BP:pounding headache, visual changes, palpitations, flushed face.   Denies medicine side effects: unusual fatigue, slow heartbeat, foot/leg swelling, cough.         Allergies   Allergen Reactions   • Codeine Rash and Itching     Per patient, itch all over body, rash   • Shellfish Allergy Diarrhea and Vomiting     Per patient, GI issues, vomiting, diarrhea       Current medicines (including changes today)  Current Outpatient Medications   Medication Sig Dispense Refill   • lisinopril-hydrochlorothiazide (PRINZIDE) 10-12.5 MG per tablet TAKE ONE TABLET BY MOUTH ONCE DAILY 90 Tab 3   • ibuprofen (MOTRIN) 800 MG Tab TAKE 1 TABLET BY MOUTH TWICE DAILY 180 Tab 0   • albuterol (PROAIR HFA) 108 (90 Base) MCG/ACT Aero Soln inhalation aerosol INHALE TWO PUFFS BY MOUTH EVERY 6 HOURS AS NEEDED FOR SHORTNESS OF BREATH 1 Inhaler 3  "  • azithromycin (ZITHROMAX) 250 MG Tab 2 tabs by mouth day 1, 1 tab by mouth days 2-5 6 Tab 0     No current facility-administered medications for this visit.        He  has a past medical history of Allergy, Arthritis, Cataract, Hyperlipidemia, and Hypertension.    Patient Active Problem List    Diagnosis Date Noted   • Sinobronchitis 11/30/2017   • Dysplastic nevus of right upper extremity 03/16/2017   • Dyslipidemia, goal LDL below 100 07/20/2016   • Obesity (BMI 30-39.9) 07/20/2016   • Bilateral hip joint arthritis 06/28/2016   • Essential hypertension 10/22/2015   • Arthritis of neck 10/22/2015   • Allergic rhinitis due to pollen 10/22/2015       ROS   No fever or chills.  No nausea or vomiting.  No chest pain or palpitations. No pain with urination or hematuria.  No black or bloody stools.       Objective:     /80 (BP Location: Right arm, Patient Position: Sitting, BP Cuff Size: Adult)   Pulse 94   Temp 36.8 °C (98.2 °F) (Temporal)   Resp 16   Ht 1.88 m (6' 2\")   Wt 123.8 kg (273 lb)   SpO2 95%  Body mass index is 35.05 kg/m².   Physical Exam:  Well developed, well nourished.  Alert, oriented in no acute distress.  Eye contact is good, speech goal directed, affect calm  Eyes: conjunctiva non-injected, sclera non-icteric.  Ears: Pinna normal. TM pearly gray.   Nose: Nares are patent.  Erythematous, swollen mucosa with yellow discharge  Mouth: Oral mucous membranes pink and moist with no lesions.  Moderate diffuse erythema the posterior pharynx without exudate  Neck Supple.  No adenopathy or masses in the neck or supraclavicular regions. No thyromegaly  Lungs: clear to auscultation bilaterally with good excursion. No wheezes or rhonchi  CV: regular rate and rhythm. No murmur  Abdomen: soft, nontender, no masses or organomegaly.  No rebound or guarding            Assessment and Plan:   The following treatment plan was discussed    1. Sinobronchitis  Zithromax as directed.  Increase fluids and rest  - " azithromycin (ZITHROMAX) 250 MG Tab; 2 tabs by mouth day 1, 1 tab by mouth days 2-5  Dispense: 6 Tab; Refill: 0    2. Dyslipidemia, goal LDL below 100  The 10-year ASCVD risk score (Jaun JB Jr., et al., 2013) is: 23.9%  Patient refuses statin use even given his coronary risk.  Discussed prevention measures.  - Lipid Profile; Future  - TSH; Future    3. Essential hypertension  This is a chronic medical condition that is currently stable  - Comp Metabolic Panel; Future  - lisinopril-hydrochlorothiazide (PRINZIDE) 10-12.5 MG per tablet; TAKE ONE TABLET BY MOUTH ONCE DAILY  Dispense: 90 Tab; Refill: 3    4. Arthritis of neck  Check chemistry panel.  Continue ibuprofen if renal function is stable.  - ibuprofen (MOTRIN) 800 MG Tab; TAKE 1 TABLET BY MOUTH TWICE DAILY  Dispense: 180 Tab; Refill: 0    5. Bilateral hip joint arthritis  Check chemistry panel.  Continue ibuprofen if renal function is stable.  - ibuprofen (MOTRIN) 800 MG Tab; TAKE 1 TABLET BY MOUTH TWICE DAILY  Dispense: 180 Tab; Refill: 0    6. Allergic rhinitis due to pollen, unspecified seasonality  Refill albuterol for occasional use  - albuterol (PROAIR HFA) 108 (90 Base) MCG/ACT Aero Soln inhalation aerosol; INHALE TWO PUFFS BY MOUTH EVERY 6 HOURS AS NEEDED FOR SHORTNESS OF BREATH  Dispense: 1 Inhaler; Refill: 3    Any change or worsening of signs or symptoms, patient encouraged to follow-up or report to the emergency room for further evaluation. Patient understands and agrees.    Followup: Return in about 1 year (around 1/29/2021).

## 2020-01-30 NOTE — ASSESSMENT & PLAN NOTE
Stable. Currently taking lisinopril-hctz 10-12.5 MG DAILY as directed.   He is not taking baby aspirin daily.   He is monitoring BP at home.   Denies symptoms low BP: light-headed, tunnel-vision, unusual fatigue.   Denies symptoms high BP:pounding headache, visual changes, palpitations, flushed face.   Denies medicine side effects: unusual fatigue, slow heartbeat, foot/leg swelling, cough.

## 2020-06-29 ENCOUNTER — PATIENT MESSAGE (OUTPATIENT)
Dept: MEDICAL GROUP | Facility: LAB | Age: 70
End: 2020-06-29

## 2020-06-29 DIAGNOSIS — I10 ESSENTIAL HYPERTENSION: ICD-10-CM

## 2020-06-30 RX ORDER — LISINOPRIL AND HYDROCHLOROTHIAZIDE 12.5; 1 MG/1; MG/1
TABLET ORAL
Qty: 90 TAB | Refills: 0 | Status: SHIPPED | OUTPATIENT
Start: 2020-06-30 | End: 2020-10-01 | Stop reason: SDUPTHER

## 2020-06-30 NOTE — TELEPHONE ENCOUNTER
From: Gaudencio Berry  To: Hannah Garcia M.D.  Sent: 6/29/2020 4:52 PM PDT  Subject: Prescription Question    Hi Dr. Garcia  I need to refill my Lisinopril and I need to change Proair inhaler to Ventolin. My Rx plan no longer covers the Proair Inhailer. Could you send my prescriptions to:  [facility]  [address] [place] [address]    Thank you  Gaudencio Berry

## 2020-06-30 NOTE — PATIENT COMMUNICATION
Received request via: Patient    Was the patient seen in the last year in this department? Yes LOV 1/29/2020    Does the patient have an active prescription (recently filled or refills available) for medication(s) requested? No

## 2020-08-14 DIAGNOSIS — M47.812 ARTHRITIS OF NECK: ICD-10-CM

## 2020-08-14 DIAGNOSIS — M16.0 BILATERAL HIP JOINT ARTHRITIS: ICD-10-CM

## 2020-08-17 RX ORDER — IBUPROFEN 800 MG/1
TABLET ORAL
Qty: 180 TAB | Refills: 0 | Status: SHIPPED | OUTPATIENT
Start: 2020-08-17 | End: 2020-11-10

## 2020-10-01 DIAGNOSIS — I10 ESSENTIAL HYPERTENSION: ICD-10-CM

## 2020-10-01 RX ORDER — LISINOPRIL AND HYDROCHLOROTHIAZIDE 12.5; 1 MG/1; MG/1
TABLET ORAL
Qty: 90 TAB | Refills: 0 | Status: SHIPPED | OUTPATIENT
Start: 2020-10-01 | End: 2020-12-30

## 2020-10-01 NOTE — TELEPHONE ENCOUNTER
Received request via: Pharmacy    Was the patient seen in the last year in this department? Yes  9/29/20  Does the patient have an active prescription (recently filled or refills available) for medication(s) requested? No

## 2020-11-10 DIAGNOSIS — M16.0 BILATERAL HIP JOINT ARTHRITIS: ICD-10-CM

## 2020-11-10 DIAGNOSIS — M47.812 ARTHRITIS OF NECK: ICD-10-CM

## 2020-11-10 RX ORDER — IBUPROFEN 800 MG/1
TABLET ORAL
Qty: 180 TAB | Refills: 0 | Status: SHIPPED | OUTPATIENT
Start: 2020-11-10

## 2021-01-15 DIAGNOSIS — Z23 NEED FOR VACCINATION: ICD-10-CM
